# Patient Record
Sex: FEMALE
[De-identification: names, ages, dates, MRNs, and addresses within clinical notes are randomized per-mention and may not be internally consistent; named-entity substitution may affect disease eponyms.]

---

## 2017-05-12 NOTE — PDOC
MODERATE SEDATION ASSESSMENT


RISKS/ALTERNATIVES


Risks/Alternatives


Risks and alternatives of this type of sedation and procedure discussed with:


RISK/ALTERNATIVES:  Patient





H & P ON CHART


H & P


H & P on chart and reviewed for co-morbid conditions and appropriate labs.


H&P ON CHART:  Yes





PREGNANCY STATUS


PREG STATUS ASSESSED:  N/A





MEDS/ALLERGIES REVIEWED


Meds/Allergies Reviewed


Medications and Allergies including time and route of recently administered 

narcotics and sedatives.


MEDS/ALLERGIES REVIEWED:  Yes





ASA RATING


ASA RATING:  II





AIRWAY ASSESSMENT


Airway Assessment


Airway patency, oral function limitations, presence  of caps, crowns, dentures, 

partials, and ability to extend neck assessed.


AIRWAY ASSESSMENT:  Yes





MALLAMPATI SCORE


MALLAMPATI SCORE:  II





PRE-SEDATION ASSESSMENT


PRE-SEDATION ASSESSMENT:  Yes











SARAHI DOUGLAS MD May 12, 2017 11:39

## 2017-05-12 NOTE — EKG
[facility]

              [address] [place] [address]

Test Date:    2017               Test Time:    00:32:09

Pat Name:     JESSEE TOMPKINS        Department:   

Patient ID:   [id number]           Room:         256 1

Gender:       F                        Technician:   

:          1980               Requested By: ANDREI LONDONO

Order Number: [id number]            Reading MD:   Capo Davis

                                 Measurements

Intervals                              Axis          

Rate:         108                      P:            180

CA:           112                      QRS:          16

QRSD:         148                      T:            -123

QT:           376                                    

QTc:          508                                    

                           Interpretive Statements

SINUS TACHYCARDIA

LBBB

Electronically Signed On 5- 9:33:46 CDT by Capo Davis

## 2017-05-12 NOTE — CARD
--------------- APPROVED REPORT --------------





Procedure(s) performed: Left heart catheterization, selective coronary angiography and left ventricul
ography via right transradial approach



INDICATION

The indication(s) include : Unstable angina, cardiomyopathy, acute systolic heart failure.



PROCEDURE NARRATIVE

After explaining the risks, benefits and alternative options, informed consent was obtained from pamela
ent.  Patient was brought to the cardiac Cath Lab and right wrist was prepped and draped in the usual
 fashion after confirming a positive modified Geoffrey's test.  Arterial access was obtained in the [facility] radial artery and a 6 [place] sheath was inserted.  6 [place] JL 3.5 and 6 [place] JR4 catheters were 
used to perform selective angiography of the left and right coronary arteries after initial attempts 
to engage these vessels using 6 [place] Tommy catheter were unsuccessful.  6 [place] pigtail catheter 
was used to perform left ventriculography.  Patient tolerated the procedure well.  Hemostasis was ach
ieved using TR band.  There were no immediate complications.  The following findings were noted.



FINDINGS

1.  Hemodynamics: Left ventricular end-diastolic pressure of 27 mmHg.  No pullback gradient across th
e aortic valve.

2.  Left ventriculography: Severe global left ventricular systolic dysfunction with ejection fraction
 estimated at 15-20%.  No significant mitral regurgitation seen.

3.  Coronary angiography:

a.  The left main coronary artery arose from the left sinus of Valsalva, gave rise to the left anteri
or descending and left circumflex arteries and did not show any significant stenosis.

b.  The left anterior descending artery did not show any significant stenosis.

c.  The left circumflex artery did not show any significant stenosis.

d.  The right coronary artery was a large and dominant vessel arising from the right sinus of Valsalv
a that did not show any significant stenosis.



Conclusion

1.  No significant coronary artery disease

2.  Severe global left ventricular systolic dysfunction with ejection fraction estimated at 15-20%



Recommendations

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
myopathy. 

Repeat 2-D echo in 3 months to evaluate the need for AICD implantation.

## 2017-05-12 NOTE — EKG
[facility]

              [address] [place] [address]

Test Date:    2017               Test Time:    10:28:40

Pat Name:     JESSEE TOMPKINS        Department:   

Patient ID:   [id number]           Room:         256 1

Gender:       F                        Technician:   

:          1980               Requested By: EVANGELINA MCKAY

Order Number: [id number]            Reading MD:   Capo Davis

                                 Measurements

Intervals                              Axis          

Rate:         88                       P:            48

MA:           132                      QRS:          44

QRSD:         122                      T:            -50

QT:           412                                    

QTc:          502                                    

                           Interpretive Statements

SINUS RHYTHM

LBBB

Electronically Signed On 5- 9:36:33 CDT by Capo Davis

## 2017-05-12 NOTE — ACF
Admission Forms Criteria


                   OBSTETRIC AND GYNECOLOGIC DISEASE [facility]





Clinical Indications for Admission to Inpatient Care





                                                                               

       (Place 'X' for any and all applicable criteria):





Hospital admission is needed for appropriate care of the patient because of 1 

or more of the following (1)(2)(3): 





[ ]I.   Hemodynamic instability, as indicated by 1 or more of the following (1)(

2)(3)(4)(5):


        [ ]a)   Vital signs or other findings not as expected for chronic 

patient condition or baseline


        [ ]b)   Instability indicated by 1 or more of the following:


                        [ ]i)       Hypotension


                        [ ]ii)      Symptomatic tachycardia unresponsive to 

treatment (eg, analgesia, fluids, sedation as indicated)


                        [ ]iii)     Inadequate perfusion indicated by 1 or more 

of the following:


                                   [ ]A.   Lactic acidosis (greater than 2 mmol/

L)


                                   [ ]B.   New abnormal capillary refill (

greater than 3 seconds)


                                   [ ]C.   Reduced urine output


                                   [ ]D.   New altered mental status


                        [ ]iv)     Orthostatic vital sign changes unresponsive 

to treatment (eg, fluids)


                        [ ]v)      Multiple IV fluid boluses required to 

maintain adequate blood pressure or perfusion


                        [ ]vi)     IV inotropic or vasopressor medication 

required to maintain adequate blood pressure or perfusion


[ ]II.   Obstetric infection requiring hospitalization indicated by 1 or more 

of the following(13)(14):


         [ ]a)        Chorioamnionitis


         [ ]b)        Endometritis (except mild postpartum endometritis)


         [ ]c)        Pelvic abscess


         [ ]d)        Peritonitis


         [ ]e)        Septic pelvic thrombophlebitis


[ ]III.  Amniotic fluid or pulmonary embolism(4)(5)(6)


[ ]IV. Suspected peritonitis or ectopic pregnancy requiring monitoring beyond 

scope of 24 hours or observation care(7)(8)


[ ]V.  Fetal compromise requiring hospitalization indicated by ALL of the 

following(9)(10):


        [ ]a)   Fetal compromise indicated by 1 or more of the following(11):


                        [ ]i)       Abnormal fetal heart rate monitoring


                        [ ]ii)      Abnormal contraction stress test


                        [ ]iii)     Abnormal fetal biophysical profile


                        [ ]iv)     Abnormal Doppler flow in fetal vessels (ie, 

Doppler velocimetry) (12)


        [ ]b)   Persistence of fetal compromise indicators during evaluation 

and observation monitoring


[ ]VI. Ovarian hyperstimulation syndrome requiring hospitalization[A] indicated 

by ALL of the following(15):


        [ ]a)   Recent ovarian stimulation with gonadotropins, or evidence on 

ultrasound of spontaneous emergence 


                        of large number of ovarian follicles


        [ ]b)   Evidence of severe ovarian hyperstimulation syndrome indicated 

by 1 or more of the following:


                        [ ]i)      Abdominal pain unresponsive to oral therapy


                        [ ]ii)     Acute respiratory distress syndrome


                        [ ]iii)    Electrolyte imbalance ( eg, hyponatremia, 

hyperkalemia)


                        [ ]iv)    Elevated liver enzymes


                        [ ]v)     Evidence of thromboembolism


                        [ ]vi)    Hemoconcentration (hematocrit greater than 45

% (0.45))


                        [ ]vii)   Inability to maintain oral intake adequate to 

prevent hemoconcentration


                        [ ]viii)  Marked hypotension from baseline (eg, SBP 20 

mmHg below patients usual pressure)


                        [ ]ix)   Oliguria or anuria


                        [ ]x)    Ovarian torsion


                        [ ]xi)   Pleural or pericardial effusion on x-ray or 

echocardiogram


                        [ ]xii)  Rapid increase in serum creatinine to greater 

than 1.2 mg/dL (106 micromoles/L) or creatinine 


                                 clearance less than 50 mL/min/1.73m2 (0.84 mL/

sec/1.73m2)


                        [ ]xiii) Ruptured ovarian cyst with hemorrhage


                        [ ]xiv) Severe abdominal pain or peritoneal signs


                        [ ]xv)  Tense ascites that cannot be managed with 

paracentesis in outpatient setting


[ ]VII.Pelvic infection requiring hospitalization indicated by 1 or more of the 

following (16):


        [ ]a)   Outpatient treatment has failed or is not appropriate (eg, 

inpatient monitoring required)


        [ ]b)   Pelvic abscess


        [ ]c)   Surgical emergency cannot be excluded (eg, rigid abdomen)


        [ ]d)   Vomiting precluding outpatient and observation care management


VIII.   Pregnancy loss complications requiring inpatient medical treatment 

indicated by 1 or more of the following (4)(7)(9):


        [ ]a)   Fever


        [ ]b)   Peritonitis


        [ ]c)   Sepsis


        [ ]d)   Severe abdominal pain


[X]IX.  Pregnant or postpartum patient requiring monitoring for severe heart 

failure, pulmonary disease, or other 


         comorbid condition (eg, peripartum cardiomyopathy) (4)(17)


[ ]X.   Pregnant patient with  rupture of membranes requiring 

hospitalization indicated by ANY ONE of the following:


         [ ]a)        Chorioamnionitis, cloudy amniotic fluid, or other 

evidence of infection


         [ ]b)        Fetal compromise or other need for fetal monitoring (11)


         [ ]c)        Gestation longer than 23 weeks and ANY ONE of the 

following:


                       [ ]i)        Abnormal (noncephalic) presentation


                       [ ]ii)       Inadequate home environment (eg, home too 

far from hospital, unable to rapidly return to hospital)


           [ ]d)      Temperature greater than 100.4 degrees F (38 degrees C)(

oral)


           [ ]e)      Threatened  labor requiring monitoring beyond 

scope (eg, over 24 hours) of observation Care


[ ]  XI.Postpartum complications, including severe lacerations, infections, or 

retained placenta (19)


[ ]  XII.Uterine bleeding with high-risk features indicated by ANY ONE of the 

following (4):


          [ ]a)      Active major hemorrhage (eg, postpartum hemorrhage)


          [ ]b)      Coagulopathy with active bleeding


          [ ]c)      Gestational trophoblastic disease (eg, molar pregnancy) (20

)


          [ ]d)      Pregnancy (longer than 23 weeks) and ANY ONE of the 

following:


                      [ ]i)       Pain


                      [ ]ii)      Placental abruption, known or suspected


                      [ ]iii)     Placenta accrete, known or suspected(21)


                      [ ]iv)     Placenta previa, known or suspected


                      [ ]v)      Vasa previa


          [ ]e)      Severe anemia


[ ]XIII.  Obstetric or Gynecologic Disease, condition or symptom for which ANY 

ONE of the following:


          [ ]a)      Emergency and observation care have failed or are not 

considered appropriate  


                      ( Also use General Criteria: Observation Care  Criteria 

as appropriate)


          [ ]b)      Presence of  a General Admission Criteria or Pediatric 

General Admission Criteria











The original [facility] content created by [facility] has been revised. 


The portions of the content which have been revised are identified through the 

use of italic text or in bold, and [facility] 


has neither reviewed nor approved the modified material.All other unmodified 

content is copyright  [facility].





Please see references footnoted in the original [facility] edition 

2016


Admission Criteria Met?:  Yes











HEMANT COOK May 12, 2017 02:27

## 2017-05-12 NOTE — CARD
--------------- APPROVED REPORT --------------





EXAM: Two-dimensional and M-mode echocardiogram with Doppler and color Doppler.



Other Information 

Quality : Good



INDICATION

Postpardum Cardiomyopathy



2D DIMENSIONS 

RVDd2.7 (2.9-3.5cm)Left Atrium(2D)4.7 (1.6-4.0cm)

IVSd1.1 (0.7-1.1cm)Aortic Root(2D)2.5 (2.0-3.7cm)

LVDd6.1 (3.9-5.9cm)LVOT Diameter2.1 (1.8-2.4cm)

PWd1.1 (0.7-1.1cm)LVDs4.8 (2.5-4.0cm)

FS (%) 20.4 %SV75.8 ml

LVEF(%)10.0 (>50%)



Aortic Valve

AoV Peak Vipin.123.0cm/sAoV VTI18.8cm

AO Peak GR.6.1mmHgLVOT  VTI 15.96cm

AO Mean GR.4mmHgAVA   (VTI)2.90cm2



Mitral Valve

MV E Yszzfiiy708.8cm/sMV DECEL PJYW12ea

MV A Jyfdutti73.5cm/sE/A  Ratio2.0



TDI

Lateral E' P. V6.69cm/sMedial E' P. V6.24cm/s

E/Lateral E'18.4E/Medial E'19.7



Tricuspid Valve

TR P. Isdagxqu744le/sRAP VKGWYBJB6zkCk

TR Peak Gr.58ncUwNAEO53plDs



Pulmonary Vein

S1 Cfgyvcck20.2cm/sS2 Dvqiyhlu50.57cm/s

D2 Axankpbr66.6cm/s



 LEFT VENTRICLE 

The Left Ventricle is mildly dilated. There is normal left ventricular wall thickness. Left ventricle
 systolic function is severely impaired. The Ejection Fraction is 10-15%. There is severe global hypo
kinesis of the left ventricle. Tissue Doppler imaging reveals moderate left ventricular diastolic dys
function.



 RIGHT VENTRICLE 

The right ventricle is normal size. The right ventricular systolic function is normal.



 ATRIA 

The left atrium is mildly dilated. The right atrium size is normal. The interatrial septum is intact 
with no evidence for an atrial septal defect or patent foramen ovale as noted on 2-D or Doppler imagi
ng.



 AORTIC VALVE 

The aortic valve is normal in structure and function. Doppler and Color Flow revealed no significant 
aortic regurgitation. There is no significant aortic valvular stenosis.



 MITRAL VALVE 

The mitral valve is normal in structure and function. There is no evidence of mitral valve prolapse. 
There is no mitral valve stenosis. Doppler and Color-flow revealed trace mitral regurgitation.



 TRICUSPID VALVE 

The tricuspid valve is normal in structure and function. Doppler and Color Flow revealed trace tricus
pid regurgitation. The PA pressure was estimated at 30 mmHg. There is no tricuspid valve stenosis.



 PULMONIC VALVE 

The pulmonary valve is normal in structure and function. Doppler and Color Flow revealed trace pulmon
ic valvular regurgitation. There is no pulmonic valvular stenosis.



 GREAT VESSELS 

The aortic root is normal in size. The ascending aorta is normal in size. The IVC is normal in size a
nd collapses >50% with inspiration.



 PERICARDIAL EFFUSION 

There is a trace circumferential pericardial effusion.



Critical Notification

Physician Notified 

Date: 05/12/2017

Time: 09:25

Physician Name:KATELYNN Madrid

Critical Value: Yes



<Conclusion>

Left ventricle systolic function is severely impaired. The Ejection Fraction is 10-15%.

There is severe global hypokinesis of the left ventricle.

## 2017-05-12 NOTE — PDOC
Provider Note


Provider Note


[id number]





dyspnea, multi factorial, acute sys chf, mild ae of asthma





see orders











MARIO ESCOBAR MD May 12, 2017 10:39

## 2017-05-12 NOTE — HP
ADMIT DATE:  2017



CHIEF COMPLAINT:  Chest pain, shortness of breath.



HISTORY OF PRESENT ILLNESS:  The patient is a 36-year-old -American woman

who presented to the Emergency Room with significant substernal chest pain which

had been present on and off for about a week and acutely worsened the day prior

to presentation.  She actually had seen her PCP and had been given a

prescription for prednisone and Z-GRETCHEN with history of asthma, but without any

noticeable relief.  Of note, the patient delivered her fourth child, via

, 3 weeks ago.  Pregnancy had been complicated by eclampsia and she had

been started on nifedipine for blood pressure at the time.



In the Emergency Room, she was found tachycardic, hypoxic; chest x-ray revealing

massive cardiomegaly and fluffy infiltrates bilaterally.  She was therefore

tentatively diagnosed with cardiomyopathy postpartum and admitted to the [facility]

for further management and care.  She has been started on Lasix IV with good

results.  Although she had required BiPAP overnight, she is now breathing with

nasal cannula 2 liters supplemental oxygen.



PAST MEDICAL HISTORY:  Pregnancy as above.  She never had hypertension or

cardiac issues prior.



FAMILY HISTORY:  Positive for heart disease with mother having CABG at age 50.



SOCIAL HISTORY:  Lives with her 4 children, age 3 months to 18 years and fiance.

 She works as a .  No toxic habits, never smoked.



ALLERGIES:  No known drug allergies.



HOME MEDICATIONS:  Nifedipine.



REVIEW OF SYSTEMS:  The patient relates that breathing is currently much

improved.  She is very fatigued, does not get much rest overnight.  Denies any

ongoing chest pain, nausea, vomiting or other symptoms in rest of organ system

review.



PHYSICAL EXAMINATION:

VITAL SIGNS:  From today show a blood pressure of 131/88, heart rate of 99,

respiratory rate at 18.  She is afebrile.

GENERAL:  This is a massively obese 36-year-old -American woman, awake,

alert, in no acute distress.

HEENT:  Shows no scleral icterus.

NECK:  Supple.

LUNGS:  Clear to auscultation bilaterally.

HEART:  Tachycardic.

ABDOMEN:  Massively obese.

EXTREMITIES:  Show no edema.

SKIN:  Warm, soft and dry without any rash.



LABORATORY DATA:  CBC from today shows a WBC of 14.5, hemoglobin 11, platelets

of 223, MCV at 80.  BUN and creatinine are 14 and 1.1.  Sodium 141, potassium

3.3.  Of note, at admission, BUN and creatinine 14 and 1 and normal

electrolytes.  Initial troponin was negative.  LFTs within normal, slight

increase in second troponin to 0.056.  ProBNP 2542.  LFTs obtained show

cholesterol at 257, triglycerides 183 with LDL cholesterol at 160.



ASSESSMENT AND PLAN:  The patient is a 36-year-old -American woman with

postpartum cardiomyopathy.  Echo actually was done this morning and ejection

fraction appears to be about 10%.  Considering, she is actually astonishingly

mild in her symptoms.  Continue IV Lasix for diuresis for the time being.



She actually already underwent a cardiac catheterization, this shows an EF of

15-20% without any significant coronary artery disease.  There is severe global

left ventricular systolic dysfunction.



Recommendations for optimization of medical therapy for her nonischemic

cardiomyopathy.  She will be followed on an outpatient basis as well with a

repeat echo in anticipated 3 months.



For hypercholesterolemia, she will be started on atorvastatin.  Prophylaxis will

be achieved with Lovenox.

 



______________________________

BHASKAR CONTEH MD



DR:  FRIEDA/nts  JOB#:  [id number] / [id number]

DD:  2017 13:24  DT:  2017 18:11

MYRNA

## 2017-05-12 NOTE — PHYS DOC
Past Medical History


Past Medical History:  Asthma, Hypertension


Past Surgical History:  , Other


Additional Past Surgical Histo:  HERNIA REPAIR


Alcohol Use:  None


Drug Use:  None





Adult General


Chief Complaint


Chief Complaint:  CHEST PAIN





HPI


HPI


36-year-old female who states she's having significant chest pain that is 

substernal and has been present for the last week and has acutely worsened 

throughout the last day. Patient's been seen by her primary doctor and started 

on a course of prednisone and Z-Srinivas without any relief. Patient does have 

history of asthma. She also states she had a complication with her recent 

pregnancy and was diagnosed with preeclampsia and had her pregnancy 3 weeks 

early in early April. She is currently on nifedipine for blood pressure 

control. Pt had no history of HTN she states prior to her last pregnancy. Pt 

denies any significant smoking history and drug use.





Review of Systems


Review of Systems





Constitutional: Denies fever or chills []


Eyes: Denies change in visual acuity, redness, or eye pain []


HENT: Denies nasal congestion or sore throat []


Respiratory: Has cough, has shortness of breath []


Cardiovascular: No additional information not addressed in HPI []


GI: Denies abdominal pain, nausea, vomiting, bloody stools or diarrhea []


: Denies dysuria or hematuria []


Musculoskeletal: Denies back pain or joint pain []


Integument: Denies rash or skin lesions []


Neurologic: Denies headache, focal weakness or sensory changes []


Endocrine: Denies polyuria or polydipsia []





Current Medications


Current Medications





Current Medications








 Medications


  (Trade)  Dose


 Ordered  Sig/Skye  Start Time


 Stop Time Status Last Admin


Dose Admin


 


 Albuterol/


 Ipratropium


  (Duoneb)  3 ml  1X  ONCE  17 02:00


 17 02:01 DC 17 01:56


3 ML


 


 Fentanyl Citrate


  (Fentanyl 2ml


 Vial)  50 mcg  1X  ONCE  17 02:00


 17 02:01 DC 17 02:17


50 MCG


 


 Info


  (Do NOT chart on


 this entry -- for


 MONITORING)  1 each  PRN DAILY  PRN  17 01:30


 17 01:29   


 


 


 Iohexol


  (Omnipaque 300


 Mg/ml)  75 ml  1X  ONCE  17 02:00


 17 02:01 DC  


 











Allergies


Allergies





Allergies








Coded Allergies Type Severity Reaction Last Updated Verified


 


  No Known Drug Allergies    17 No











Physical Exam


Physical Exam





Constitutional: Well developed, well nourished, moderate distress []


HENT: Normocephalic, atraumatic, bilateral external ears normal, oropharynx 

moist, no oral exudates, nose normal. []


Eyes: PERRLA, EOMI, conjunctiva normal, no discharge. [] 


Neck: Normal range of motion, no tenderness, supple, no stridor. [] 


Cardiovascular:Heart rate tachycardic with regular rhythm, no murmur []


Lungs & Thorax:  Diminished bilaterally, tachypneic, moderate respiratory 

distress. []


Abdomen: Bowel sounds normal, soft, no tenderness, no masses, no pulsatile 

masses. [] 


Skin: Warm, dry, no erythema, no rash. [] 


Back: No tenderness, no CVA tenderness. [] 


Extremities: No tenderness, no cyanosis, no clubbing, ROM intact, no edema. [] 


Neurologic: Alert and oriented X 3, normal motor function, normal sensory 

function, no focal deficits noted. []


Psychologic: Affect normal, judgement normal, mood normal. []





Current Patient Data


Vital Signs





 Vital Signs








  Date Time  Temp Pulse Resp B/P (MAP) Pulse Ox O2 Delivery O2 Flow Rate FiO2


 


17 01:58     93 Nasal Cannula 4.0 


 


17 00:29 99.0 108 24 161/94 (116)    





 99.0       








Lab Values





 Laboratory Tests








Test


  17


00:40 17


01:16


 


White Blood Count


  14.5 x10^3/uL


(4.0-11.0)  H 


 


 


Red Blood Count


  4.35 x10^6/uL


(3.50-5.40) 


 


 


Hemoglobin


  11.0 g/dL


(12.0-15.5)  L 


 


 


Hematocrit


  34.6 %


(36.0-47.0)  L 


 


 


Mean Corpuscular Volume


  80 fL ()


  


 


 


Mean Corpuscular Hemoglobin 25 pg (25-35)   


 


Mean Corpuscular Hemoglobin


Concent 32 g/dL


(31-37) 


 


 


Red Cell Distribution Width


  18.5 %


(11.5-14.5)  H 


 


 


Platelet Count


  223 x10^3/uL


(140-400) 


 


 


Neutrophils (%) (Auto) 75 % (31-73)  H 


 


Lymphocytes (%) (Auto) 20 % (24-48)  L 


 


Monocytes (%) (Auto) 4 % (0-9)   


 


Eosinophils (%) (Auto) 0 % (0-3)   


 


Basophils (%) (Auto) 1 % (0-3)   


 


Neutrophils # (Auto)


  10.9 x10^3uL


(1.8-7.7)  H 


 


 


Lymphocytes # (Auto)


  2.9 x10^3/uL


(1.0-4.8) 


 


 


Monocytes # (Auto)


  0.6 x10^3/uL


(0.0-1.1) 


 


 


Eosinophils # (Auto)


  0.0 x10^3/uL


(0.0-0.7) 


 


 


Basophils # (Auto)


  0.1 x10^3/uL


(0.0-0.2) 


 


 


Sodium Level


  142 mmol/L


(136-145) 


 


 


Potassium Level


  3.7 mmol/L


(3.5-5.1) 


 


 


Chloride Level


  107 mmol/L


() 


 


 


Carbon Dioxide Level


  26 mmol/L


(21-32) 


 


 


Anion Gap 9 (6-14)   


 


Blood Urea Nitrogen


  14 mg/dL


(7-20) 


 


 


Creatinine


  1.0 mg/dL


(0.6-1.0) 


 


 


Estimated GFR


(Cockcroft-Gault) 62.4  


  


 


 


Glucose Level


  117 mg/dL


(70-99)  H 


 


 


Calcium Level


  8.9 mg/dL


(8.5-10.1) 


 


 


Total Bilirubin


  0.3 mg/dL


(0.2-1.0) 


 


 


Direct Bilirubin


  0.1 mg/dL


(0.0-0.2) 


 


 


Aspartate Amino Transferase


(AST) 19 U/L (15-37)


  


 


 


Alanine Aminotransferase (ALT)


  29 U/L (14-59)


  


 


 


Alkaline Phosphatase


  78 U/L


() 


 


 


Troponin I Quantitative


  < 0.017 ng/mL


(0.000-0.055) 


 


 


NT-Pro-B-Type Natriuretic


Peptide 2542 pg/mL


(0-124)  H 


 


 


Total Protein


  7.4 g/dL


(6.4-8.2) 


 


 


Albumin


  3.0 g/dL


(3.4-5.0)  L 


 


 


POC Urine HCG, Qualitative


  


  Hcg negative


(Negative)





 Laboratory Tests


17 00:40








 Laboratory Tests


17 00:40














EKG


EKG


EKG as interpreted by me reveals a sinus tachycardia with a rate of 108 bpm.  

There is a non-specific intraventricular block. There are some noted t-wave 

inversions to lead 2, lead 3, AVF, V5, V6. This EKG does not meet STEMI 

criteria.





Radiology/Procedures


Radiology/Procedures


Portable one view of the chest as interpreted by me shows a cardiomegaly with 

no other acute abnormality seen.





Course & Med Decision Making


Course & Med Decision Making


Pertinent Labs and Imaging studies reviewed. (See chart for details)





This 36-year-old female is having similar chest pain or shortness of breath. 

She is mildly hypertensive upon arrival and in mild distress. Her chest film 

shows significant cardiomegaly. Her EKG shows a sinus tachycardia with some 

noted T-wave inversions throughout. Her recent pregnancy puts her at moderate 

risk for pulmonary embolus and she'll receive a CT of her chest to rule this 

out.





Her laboratory workup indicates a slightly elevated white count. Her troponin 

is negative. Her BNP is elevated at 2459. Patient was taken to get a CT of her 

chest but when attempting to lie flat she became acutely anxious and unable to 

undergo this study. She was returned to the room and will have a breathing 

treatment administered. A dose of Ativan will be given and another attempt to 

obtain a CT scan will be made.





After several DuoNeb treatments and a dose of Ativan, the patient is still 

significantly tachycardic and orthopneic and unwilling to lie flat for CT. A 

prophylactic dose of Lovenox will be given. Lasix will be given as well. At 

this time, it appears that the patient is having an advanced postpartum 

cardiomyopathy. Patient will be placed on BiPAP and a blood gas will be 

obtained after 30 minutes of treatment. Patient will be admitted on BiPAP 

therapy in an attempt to obtain a CT of her chest will be made later today. I 

will communicate this with the hospitalist in the morning. Consults to 

cardiology and OB will be made. She was admitted to the [facility] without incident. 

Her blood gas upon admission showed a pH of 7.35 with a pCO2 of 38.5 and a p02 

of 56.3 with a bicarb of 21.





Dragon Disclaimer


Dragon Disclaimer


This electronic medical record was generated, in whole or in part, using a 

voice recognition dictation system.





Departure


Departure


Impression:  


 Primary Impression:  


 Postpartum cardiomyopathy


 Additional Impressions:  


 CHF exacerbation


 Dyspnea


Disposition:   ADMITTED AS INPATIENT


Admitting Physician:  Paz, Batsheva


Condition:  STABLE


Referrals:  


UNKNOWN PCP NAME (PCP)





Problem Qualifiers











ANDREI LONDONO DO May 12, 2017 01:34

## 2017-05-12 NOTE — CONS
DATE OF CONSULTATION:  05/12/2017



I was asked to see this 36-year-old lady for shortness of breath, asthma.



HISTORY OF PRESENT ILLNESS:  The patient has minimal remote history of smoking. 

She had her baby on 04/03/2017 and since then, she has had significant shortness

of breath.  She has had occasional cough and wheezing.  She has nasal

congestion.  She has had occasional chest pain.  She had an echocardiogram done

during this hospitalization, which did show ejection fraction of 10-15%;

Cardiology has admitted.  She was given Lasix with improvement of her shortness

of breath.  She was on BiPAP overnight.  She does have snoring and excessive

daytime sleepiness, has not had any sleep study.



PAST MEDICAL HISTORY:  Asthma, preeclampsia during pregnancy.



ALLERGIES:  No known drug allergies.



MEDICATIONS:  She is on Lovenox 120 mg b.i.d., DuoNeb, Lasix was given.



SOCIAL HISTORY:  Minimal remote history of smoking.



FAMILY HISTORY:  There is no history of lung disease.



REVIEW OF SYSTEMS:  As mentioned as above, other systems are otherwise negative.



PHYSICAL EXAMINATION:

GENERAL:  This is an obese lady.

VITAL SIGNS:  Her O2 saturation is 94%, respiratory rate 20, heart rate 98,

blood pressure 121/76, temperature 98.1.

HEENT:  Normocephalic, atraumatic.  Pupils equal, round, reactive to light. 

Throat is clear.  There is shallow oropharynx.  Nose is clear.

NECK:  Positive JVD.  No lymphadenopathy.

CARDIOVASCULAR:  Regular rate and rhythm.  PMI is nondisplaced.

CHEST:  Inspection is normal.

LUNGS:  There are bibasilar crackles, a few end expiratory wheezing.

ABDOMEN:  Soft and obese, bowel sounds are good.  There is no mass.

EXTREMITIES:  Trace edema.

LYMPHATICS:  There is no lymphadenopathy.

SKIN:  Warm.

NEUROLOGIC:  Alert and oriented x 3.



LABORATORY DATA:  I reviewed the following lab data:  Chest x-ray shows

cardiomegaly, increased vascular marking.  WBC 14.5, hemoglobin 11, platelet

223.  Sodium 141, potassium 3.3, chloride 106, CO2 of 27, glucose 93, BUN 14,

creatinine 1.1.  BNP 2542.  Troponin 0.05.  Triglyceride 183, cholesterol 257. 

Total bilirubin 0.3, AST 19, ALT 29, alkaline phosphatase 78.



IMPRESSION:

1.  Dyspnea, multifactorial in etiology including acute systolic congestive

heart failure, asthma with mild acute exacerbation versus others.

2.  Abnormal chest x-ray.

3.  Acute systolic congestive heart failure.

4.  Postpartum cardiomyopathy.

5.  Obesity; snoring and excessive daytime sleepiness, probable obstructive

sleep apnea-hypopnea syndrome.

6.  Preeclampsia in recent pregnancy.



PLAN AND RECOMMENDATIONS:

1.  Titrate FiO2 to keep O2 saturation 92%.

2.  Bronchodilator.

3.  Add inhaled corticosteroid.

4.  Keep intake less than output.  I do recommend Lasix.  Monitor potassium and

creatinine.

5.  I agree with CT angiogram.

6.  Continue Lovenox until a CT angiogram is done.  If it is negative for

pulmonary embolism, I do recommend to change Lovenox to 40 mg subcutaneous

daily.

7.  I have discussed obstructive sleep apnea-hypopnea syndrome.  The importance

of diagnosis and treatment, if untreated, increased cardiovascular and CNS

morbidity and mortality.  I do recommend a sleep study as an outpatient.

8.  pfts as an outpatient.

9.  Cardiology is consulted.

10.  The findings and recommendations were discussed with the patient and RN.  I

have answered all of the patient's questions.



Thank you very much for allowing me to participate in care of this very nice

lady.

 



______________________________

MARIO ESCOBAR M.D.



DR:  Bonny  JOB#:  [id number] / [id number]

DD:  05/12/2017 10:38  DT:  05/12/2017 12:00

MYRNA

## 2017-05-12 NOTE — RAD
Exam performed: One view chest. 



Indication: chest pain tonight



Date of Service: 5/12/2017 2:26 AM  Comparison: 09/20/11.



Single AP upright portable view chest findings:



Moderate cardiomegaly. Pulmonary vascularity is unremarkable. No acute

infiltrates, effusion or pneumothorax is detected.  The bony structures are

normal. 



Impression:



No acute cardiopulmonary process is detected.

## 2017-05-12 NOTE — PDOC2
EVANGELINA MCKAY APRN 17 0920:


CARDIAC CONSULT


DATE OF CONSULT


Date of Consult


DATE: 17 


TIME: 09:02





REASON FOR CONSULT


Reason for Consult:


CHF exacerbation, post partum cardiomyopathy





REFERRING PHYSICIAN


Referring Physician:


Ree





SOURCE


Source:  Chart review, Patient





HISTORY OF PRESENT ILLNESS


HISTORY OF PRESENT ILLNESS


This is a pleasant 37 yo female admitted for complains of chest pain and SOA.  

Reports that she had preeclampsia with her BP running mainly in the 160/90s and 

her baby had to be delivered 3 weeks early via C section on 4/3/2017.  Her baby 

is 5 lbs.  She only gain about 15 lbs throughout her pregnancy.  She was taking 

procardia during her pregnancy.  Reports that 2 weeks ago she started having 

mid chest tightness as well as SOA. Notable heaviness to right arm.  It started 

of with exertion then eventually a week ago progressed at rest.  She has been 

having intermittent chest pain and last night she had diaphoresis and nausea. 2 

days ago she was though to have asthma exacerbation in which she was given by 

her PCP, Z pack and steroids but these did not help even with the use of her 

albuterol.  Notable for orthopnea and PND. Denies any hx of any recent falls, 

injury, no VTE. Denies any symptoms of HELLP syndrome during her pregnancy.  No 

prior CAD or diagnosed LAINA





PAST MEDICAL HISTORY


Cardiovascular:  HTN (preeclampsia)


Pulmonary:  Asthma


CENTRAL NERVOUS SYSTEM:  Other (No pertinent history)


GI:  No pertinent hx


Heme/Onc:  No pertinent hx


Hepatobiliary:  No pertinent hx


Psych:  No pertinent hx


Musculoskeletal:  Other (None)


Rheumatologic:  No pertinent hx


Infectious disease:  No pertinent hx


ENT:  Allergic Rhinitis


Renal/:  No pertinent hx


Endocrine:  No pertinent hx


Dermatology:  No pertinent hx


Grav:  5


Para:  4





PAST SURGICAL HISTORY


Past Surgical History:  , Hernia Repair, Tubal Ligation, Other (1 

miscarriage)





FAMILY HISTORY


Family History:  Coronary Artery Disease (Mother 50s)





SOCIAL HISTORY


Smoke:  No


ALCOHOL:  occassional


Drugs:  None


Lives:  with Family





CURRENT MEDICATIONS


CURRENT MEDICATIONS





Current Medications








 Medications


  (Trade)  Dose


 Ordered  Sig/Skye


 Route


 PRN Reason  Start Time


 Stop Time Status Last Admin


Dose Admin


 


 Albuterol/


 Ipratropium


  (Duoneb)  3 ml  1X  ONCE


 NEB


   17 02:00


 17 02:01 DC 17 01:56


 


 


 Fentanyl Citrate


  (Fentanyl 2ml


 Vial)  50 mcg  1X  ONCE


 IV


   17 02:00


 17 02:01 DC 17 02:17


 


 


 Lorazepam


  (Ativan)  1 mg  1X  ONCE


 IV


   17 02:15


 17 02:16 DC 17 02:13


 


 


 Ondansetron HCl


  (Zofran)  4 mg  PRN Q8HRS  PRN


 IV


 NAUSEA/VOMITING  17 02:15


 17 02:14  17 04:07


 


 


 Albuterol/


 Ipratropium


  (Duoneb)  3 ml  RTQID


 NEB


   17 08:00


 17 07:59  17 06:48


 


 


 Furosemide


  (Lasix)  40 mg  1X  ONCE


 IVP


   17 02:30


 17 02:31 DC 17 02:51


 


 


 Albuterol/


 Ipratropium


  (Duoneb)  3 ml  1X  ONCE


 NEB


   17 03:00


 17 03:01 DC 17 02:28


 


 


 Enoxaparin Sodium


  (Lovenox 120mg


 Syringe)  120 mg  Q12HR


 SQ


   17 03:30


    17 04:07


 


 


 Info


  (Anti-Coagulation


 Monitoring By


 Pharmacy)  1 each  PRN DAILY  PRN


 [facility]


 SEE COMMENTS  17 03:00


    17 03:39


 


 


 Benzonatate


  (Tessalon Perle)  100 mg  1X  ONCE


 PO


   17 03:30


 17 03:31 DC 17 03:30


 


 


 Benzonatate


  (Tessalon Perle)  100 mg  1X  ONCE


 PO


   17 04:00


 17 04:01 DC 17 04:07


 











ALLERGIES


ALLERGIES:  


Coded Allergies:  


     No Known Drug Allergies (Unverified , 17)





ROS


Review of System


14 point ROS evaluated with pertinent positives noted per HPI





PHYSICAL EXAM


General:  Alert, Oriented X3, Cooperative, No acute distress


HEENT:  Atraumatic, Mucous membr. moist/pink


Lungs:  Clear to auscultation, Normal air movement


Heart:  Regular rate (SR LBBB), Normal S1, Normal S2, Other (S3)


Abdomen:  Soft, No tenderness


Extremities:  No cyanosis, Other (1+ pitting edema)


Skin:  No breakdown, No significant lesion


Neuro:  Normal speech, Sensation intact


Psych/Mental Status:  Mental status NL, Mood NL


MUSCULOSKELETAL:  Full range of motion without pain





VITALS


VITALS





Vital Signs








  Date Time  Temp Pulse Resp B/P (MAP) Pulse Ox O2 Delivery O2 Flow Rate FiO2


 


17 08:25      BiPAP/CPAP  


 


17 07:00 98.1 98 20 121/76 (91) 96   





 98.1       


 


17 02:44        











LABS


Lab:





Laboratory Tests








Test


  17


00:40 17


01:16 17


02:36 17


06:41


 


White Blood Count


  14.5 x10^3/uL


(4.0-11.0) 


  


  


 


 


Red Blood Count


  4.35 x10^6/uL


(3.50-5.40) 


  


  


 


 


Hemoglobin


  11.0 g/dL


(12.0-15.5) 


  


  


 


 


Hematocrit


  34.6 %


(36.0-47.0) 


  


  


 


 


Mean Corpuscular Volume 80 fL ()    


 


Mean Corpuscular Hemoglobin 25 pg (25-35)    


 


Mean Corpuscular Hemoglobin


Concent 32 g/dL


(31-37) 


  


  


 


 


Red Cell Distribution Width


  18.5 %


(11.5-14.5) 


  


  


 


 


Platelet Count


  223 x10^3/uL


(140-400) 


  


  


 


 


Neutrophils (%) (Auto) 75 % (31-73)    


 


Lymphocytes (%) (Auto) 20 % (24-48)    


 


Monocytes (%) (Auto) 4 % (0-9)    


 


Eosinophils (%) (Auto) 0 % (0-3)    


 


Basophils (%) (Auto) 1 % (0-3)    


 


Neutrophils # (Auto)


  10.9 x10^3uL


(1.8-7.7) 


  


  


 


 


Lymphocytes # (Auto)


  2.9 x10^3/uL


(1.0-4.8) 


  


  


 


 


Monocytes # (Auto)


  0.6 x10^3/uL


(0.0-1.1) 


  


  


 


 


Eosinophils # (Auto)


  0.0 x10^3/uL


(0.0-0.7) 


  


  


 


 


Basophils # (Auto)


  0.1 x10^3/uL


(0.0-0.2) 


  


  


 


 


Sodium Level


  142 mmol/L


(136-145) 


  


  


 


 


Potassium Level


  3.7 mmol/L


(3.5-5.1) 


  


  


 


 


Chloride Level


  107 mmol/L


() 


  


  


 


 


Carbon Dioxide Level


  26 mmol/L


(21-32) 


  


  


 


 


Anion Gap 9 (6-14)    


 


Blood Urea Nitrogen


  14 mg/dL


(7-20) 


  


  


 


 


Creatinine


  1.0 mg/dL


(0.6-1.0) 


  


  


 


 


Estimated GFR


(Cockcroft-Gault) 62.4 


  


  


  


 


 


Glucose Level


  117 mg/dL


(70-99) 


  


  


 


 


Calcium Level


  8.9 mg/dL


(8.5-10.1) 


  


  


 


 


Total Bilirubin


  0.3 mg/dL


(0.2-1.0) 


  


  


 


 


Direct Bilirubin


  0.1 mg/dL


(0.0-0.2) 


  


  


 


 


Aspartate Amino Transf


(AST/SGOT) 19 U/L (15-37) 


  


  


  


 


 


Alanine Aminotransferase


(ALT/SGPT) 29 U/L (14-59) 


  


  


  


 


 


Alkaline Phosphatase


  78 U/L


() 


  


  


 


 


Troponin I Quantitative


  < 0.017 ng/mL


(0.000-0.055) 


  


  0.056 ng/mL


(0.000-0.055)


 


NT-Pro-B-Type Natriuretic


Peptide 2542 pg/mL


(0-124) 


  


  


 


 


Total Protein


  7.4 g/dL


(6.4-8.2) 


  


  


 


 


Albumin


  3.0 g/dL


(3.4-5.0) 


  


  


 


 


Bedside Urine HCG, Qualitative


  


  Hcg negative


(Negative) 


  


 


 


O2 Saturation   86 % (92-99)  


 


Arterial Blood pH


  


  


  7.35


(7.35-7.45) 


 


 


Arterial Blood pCO2 at


Patient Temp 


  


  39 mmHg


(35-46) 


 


 


Arterial Blood pO2 at Patient


Temp 


  


  56 mmHg


() 


 


 


Arterial Blood HCO3


  


  


  21 mmol/L


(21-28) 


 


 


Arterial Blood Base Excess


  


  


  -4 mmol/L


(-3-3) 


 


 


FiO2   30.0  











ASSESSMENT/PLAN


ASSESSMENT/PLAN


1. Acute systolic CHF: New.  NYHA 2


2. Cardiomyopathy: preliminary EF at 15-20% with global hypokinesis. Trop at 

0.056 with EKG at SR LBBB. New. 


3. NSTEMI: intermittent CP since 2 weeks ago. Received bipap overnight.  Now 

off. Able to lay flat O2 sat 99% on RA


4. Postpartum: early delivery via C section 4/3/2017. Recent tubal ligation. 


5. Asthma exacerbation: likely induced by CHF. 


6. Hx of preeclampsia: with recent pregnancy treated with adalat


7. Morbid obesity: BMI 46


8. Family hx of CAD: Mother just had an MI in her 50s


9. Suspect metabolic syndrome: positive for acanthoses nigricans. Negative for 

gestational DM. 


10. HTN: remains uncontrolled at home


 


FYI CTA not done due to inability to lay flat last night. 





Recommendations


1. LHC today.  Risks and benefits explained and agreeable to proceed


2. Received Lasix in ED.  


3. Will continue lasix, start onSRB, BB low dose post cath.  Will DC adalat.  

Pt does not breast feed. 


4. BMP, Mg, TSH, A1C, lipid, DDIMER


5. ASA. 


6. Will likely need lifevest. Further recommendation pending cath result


Problems:  





SARAHI DOUGLAS MD 17 1138:


CARDIAC CONSULT


ALLERGIES


ALLERGIES:  


Coded Allergies:  


     No Known Drug Allergies (Unverified , 17)





ASSESSMENT/PLAN


ASSESSMENT/PLAN


Patient seen and examined. Agree with NP's assessment and plan.


Cardiomyopathy with LVEF 15-20% most probably peripartum cardiomyopathy but 

ischemic etiology needs to be ruled out due to presentation and strong family 

history.


Plan for cardiac catheterization and possible angioplasty today. Risks and 

benefits were explained.


Start beta blockers.


Thank you for your consultation.


Problems:  











EVANGELINA MCKAY May 12, 2017 09:20


SARAHI DOUGLAS MD May 12, 2017 11:38

## 2017-05-13 NOTE — CONS
DATE OF CONSULTATION:  



HISTORY OF PRESENT ILLNESS:  This patient is a 36-year-old -American

female who is  4, para 4, admitted to the hospital with a history of

having chest pain, difficulty to breathe.  She has had last delivery by 

section and also had tubal ligation about 5 weeks ago at [facility] and she is a patient of  ____ and was sent home after the delivery and

comes back with chest pain and difficulty to breathe at this time and she has no

history of any vaginal bleeding at this time and she is not breast feeding.



PHYSICAL EXAMINATION:

VITAL SIGNS:  Her vital signs have been stable and no history of any fever.

ABDOMEN:  On examination, abdomen feels soft and uterus ____, no excessive

vaginal bleeding.  The patient is quite obese and there is no history of any

toxemia or high blood pressure during pregnancy.



DIAGNOSES:  Postpartum cardiomyopathy and congestive heart failure.



RECOMMENDATION:  Would continue with the present treatment that she is getting

and she should follow with cardiologist recommendations and she was advised bed

rest at this time until further recovery and we will be glad to follow the

patient with you.



Thank you for giving me the opportunity to participate in the care and

management of this patient.

 



______________________________

МАРИНА REYES MD



DR:  JOYCE/nic  JOB#:  [id number] / [id number]

DD:  2017 08:37  DT:  2017 15:10

## 2017-05-13 NOTE — PDOC
RAFAELDARREN TRUDY APRN 5/13/17 0930:


CARDIO Progress Notes


Date and Time


Date of Service


05/13/2017


Time of Evaluation


0926





Subjective


Subjective:  No Chest Pain, No shortness of breath, No Palpitations, No 

Dizziness





Vitals


Vitals





Vital Signs








  Date Time  Temp Pulse Resp B/P (MAP) Pulse Ox O2 Delivery O2 Flow Rate FiO2


 


5/13/17 08:53  84  119/67    


 


5/13/17 08:16      Room Air  


 


5/13/17 07:43     98   


 


5/13/17 07:00 97.6  20     





 97.6       


 


5/12/17 08:00       4.0 








Weight


Weight [ ]





Input and Output


Intake and Output











Intake and Output 


 


 5/13/17





 07:00


 


Intake Total 1400 ml


 


Output Total 3000 ml


 


Balance -1600 ml


 


 


 


Intake Oral 1400 ml


 


Output Urine Total 3000 ml


 


Urine/Stool Mix 0 ml











Laboratory


Labs





Laboratory Tests








Test


  5/12/17


09:35 5/13/17


04:40


 


D-Dimer (Neelima)


  1.65 ug/mlFEU


(0.00-0.50) 


 


 


Sodium Level


  141 mmol/L


(136-145) 140 mmol/L


(136-145)


 


Potassium Level


  3.3 mmol/L


(3.5-5.1) 4.1 mmol/L


(3.5-5.1)


 


Chloride Level


  106 mmol/L


() 107 mmol/L


()


 


Carbon Dioxide Level


  27 mmol/L


(21-32) 25 mmol/L


(21-32)


 


Anion Gap 8 (6-14)  8 (6-14) 


 


Blood Urea Nitrogen


  14 mg/dL


(7-20) 15 mg/dL


(7-20)


 


Creatinine


  1.1 mg/dL


(0.6-1.0) 1.0 mg/dL


(0.6-1.0)


 


Estimated GFR


(Cockcroft-Gault) 55.9 


  62.4 


 


 


Glucose Level


  93 mg/dL


(70-99) 91 mg/dL


(70-99)


 


Hemoglobin A1c


  5.6 %


(4.8-5.6) 


 


 


Calcium Level


  8.6 mg/dL


(8.5-10.1) 8.4 mg/dL


(8.5-10.1)


 


Magnesium Level


  1.7 mg/dL


(1.8-2.4) 


 


 


Triglycerides Level


  183 mg/dL


(0-150) 


 


 


Cholesterol Level


  257 mg/dL


(0-200) 


 


 


LDL Cholesterol, Calculated


  160 mg/dL


(0-100) 


 


 


VLDL Cholesterol, Calculated


  37 mg/dL


(0-40) 


 


 


Non-HDL Cholesterol Calculated


  197 mg/dL


(0-129) 


 


 


HDL Cholesterol


  60 mg/dL


(40-60) 


 


 


Cholesterol/HDL Ratio 4.3  











Physical Exam


HEENT:  Neck Supple W Full Motion


Chest:  Symmetric


LUNGS:  Other (decreased in bases bilaterally)


Heart:  S1S2, RRR


Abdomen:  Soft N/T, Other (truncal obesity)


Extremities:  No Edema


Neurology:  alert, oriented, follow commands





Assessment


Assessment


1. Acute systolic CHF


   NYHA class 2


   continue medical management


   


2. Cardiomyopathy, non-ischemic


   LVEF depressed ~ 10 -15%


   cardiac cath with significant disease; 5/12/2017


   discussed Life[employer]est with patient and given information - she will consider - 

concerned about ability to work


3.  HTN


   pre-eclamptic with recent pregnancy


   controlled with meds


   


 4. Asthma exacerbation


   per pulm





5. Morbid obesity: BMI 46





SHERLEY ADAMS MD 5/13/17 1019:


CARDIO Progress Notes


Plan


Plan


Pt. seen and examined. AGree with above NP Note. 


No acute events. 


Cardiac cath is angiographically NORMAL. No significant disease identified. EF 

is severely diminished. 


Elevated ddimer being evaluated by pulmonary


No chest pain


Will consider lifevest and repeat outpt eval to determine if she would qualify 

for ICD


Supportive care


Ok to dc later today.











DARREN HALL May 13, 2017 09:30


SHERLEY ADAMS MD May 13, 2017 10:19

## 2017-05-13 NOTE — PDOC
PULMONARY PROGRESS NOTES


Subjective


sob, is better. has some pleuritic cp, occ cough.


Vitals





Vital Signs








  Date Time  Temp Pulse Resp B/P (MAP) Pulse Ox O2 Delivery O2 Flow Rate FiO2


 


5/13/17 08:53  84  119/67    


 


5/13/17 08:16      Room Air  


 


5/13/17 07:43     98   


 


5/13/17 07:00 97.6  20     





 97.6       


 


5/12/17 08:00       4.0 








ROS:  No Nausea, No Abdominal Pain


HEENT:  Other (nc at perrl. shallow oropharynx nose clear)


Lungs:  Clear


Cardiovascular:  S1, S2


Abdomen:  Soft, Non-tender


Neuro Exam:  Alert


Extremities:  Other (edema)


Skin:  Warm


Labs





Laboratory Tests








Test


  5/12/17


00:40 5/12/17


01:16 5/12/17


02:36 5/12/17


06:41


 


White Blood Count


  14.5 x10^3/uL


(4.0-11.0) 


  


  


 


 


Red Blood Count


  4.35 x10^6/uL


(3.50-5.40) 


  


  


 


 


Hemoglobin


  11.0 g/dL


(12.0-15.5) 


  


  


 


 


Hematocrit


  34.6 %


(36.0-47.0) 


  


  


 


 


Mean Corpuscular Volume 80 fL ()    


 


Mean Corpuscular Hemoglobin 25 pg (25-35)    


 


Mean Corpuscular Hemoglobin


Concent 32 g/dL


(31-37) 


  


  


 


 


Red Cell Distribution Width


  18.5 %


(11.5-14.5) 


  


  


 


 


Platelet Count


  223 x10^3/uL


(140-400) 


  


  


 


 


Neutrophils (%) (Auto) 75 % (31-73)    


 


Lymphocytes (%) (Auto) 20 % (24-48)    


 


Monocytes (%) (Auto) 4 % (0-9)    


 


Eosinophils (%) (Auto) 0 % (0-3)    


 


Basophils (%) (Auto) 1 % (0-3)    


 


Neutrophils # (Auto)


  10.9 x10^3uL


(1.8-7.7) 


  


  


 


 


Lymphocytes # (Auto)


  2.9 x10^3/uL


(1.0-4.8) 


  


  


 


 


Monocytes # (Auto)


  0.6 x10^3/uL


(0.0-1.1) 


  


  


 


 


Eosinophils # (Auto)


  0.0 x10^3/uL


(0.0-0.7) 


  


  


 


 


Basophils # (Auto)


  0.1 x10^3/uL


(0.0-0.2) 


  


  


 


 


Sodium Level


  142 mmol/L


(136-145) 


  


  


 


 


Potassium Level


  3.7 mmol/L


(3.5-5.1) 


  


  


 


 


Chloride Level


  107 mmol/L


() 


  


  


 


 


Carbon Dioxide Level


  26 mmol/L


(21-32) 


  


  


 


 


Anion Gap 9 (6-14)    


 


Blood Urea Nitrogen


  14 mg/dL


(7-20) 


  


  


 


 


Creatinine


  1.0 mg/dL


(0.6-1.0) 


  


  


 


 


Estimated GFR


(Cockcroft-Gault) 62.4 


  


  


  


 


 


Glucose Level


  117 mg/dL


(70-99) 


  


  


 


 


Calcium Level


  8.9 mg/dL


(8.5-10.1) 


  


  


 


 


Total Bilirubin


  0.3 mg/dL


(0.2-1.0) 


  


  


 


 


Direct Bilirubin


  0.1 mg/dL


(0.0-0.2) 


  


  


 


 


Aspartate Amino Transf


(AST/SGOT) 19 U/L (15-37) 


  


  


  


 


 


Alanine Aminotransferase


(ALT/SGPT) 29 U/L (14-59) 


  


  


  


 


 


Alkaline Phosphatase


  78 U/L


() 


  


  


 


 


Troponin I Quantitative


  < 0.017 ng/mL


(0.000-0.055) 


  


  0.056 ng/mL


(0.000-0.055)


 


NT-Pro-B-Type Natriuretic


Peptide 2542 pg/mL


(0-124) 


  


  


 


 


Total Protein


  7.4 g/dL


(6.4-8.2) 


  


  


 


 


Albumin


  3.0 g/dL


(3.4-5.0) 


  


  


 


 


Bedside Urine HCG, Qualitative


  


  Hcg negative


(Negative) 


  


 


 


Urine Opiates Screen  Neg (NEG)   


 


Urine Methadone Screen  Neg (NEG)   


 


Urine Barbiturates  Neg (NEG)   


 


Urine Phencyclidine Screen  Neg (NEG)   


 


Urine


Amphetamine/Methamphetamine 


  Neg (NEG) 


  


  


 


 


Urine Benzodiazepines Screen  Neg (NEG)   


 


Urine Cocaine Screen  Neg (NEG)   


 


Urine Cannabinoids Screen  Neg (NEG)   


 


Urine Ethyl Alcohol  Neg (NEG)   


 


O2 Saturation   86 % (92-99)  


 


Arterial Blood pH


  


  


  7.35


(7.35-7.45) 


 


 


Arterial Blood pCO2 at


Patient Temp 


  


  39 mmHg


(35-46) 


 


 


Arterial Blood pO2 at Patient


Temp 


  


  56 mmHg


() 


 


 


Arterial Blood HCO3


  


  


  21 mmol/L


(21-28) 


 


 


Arterial Blood Base Excess


  


  


  -4 mmol/L


(-3-3) 


 


 


FiO2   30.0  


 


Thyroid Stimulating Hormone


(TSH) 


  


  


  0.923 uIU/mL


(0.358-3.74)


 


Test


  5/12/17


09:35 5/13/17


04:40 


  


 


 


D-Dimer (Neelima)


  1.65 ug/mlFEU


(0.00-0.50) 


  


  


 


 


Sodium Level


  141 mmol/L


(136-145) 140 mmol/L


(136-145) 


  


 


 


Potassium Level


  3.3 mmol/L


(3.5-5.1) 4.1 mmol/L


(3.5-5.1) 


  


 


 


Chloride Level


  106 mmol/L


() 107 mmol/L


() 


  


 


 


Carbon Dioxide Level


  27 mmol/L


(21-32) 25 mmol/L


(21-32) 


  


 


 


Anion Gap 8 (6-14)  8 (6-14)   


 


Blood Urea Nitrogen


  14 mg/dL


(7-20) 15 mg/dL


(7-20) 


  


 


 


Creatinine


  1.1 mg/dL


(0.6-1.0) 1.0 mg/dL


(0.6-1.0) 


  


 


 


Estimated GFR


(Cockcroft-Gault) 55.9 


  62.4 


  


  


 


 


Glucose Level


  93 mg/dL


(70-99) 91 mg/dL


(70-99) 


  


 


 


Hemoglobin A1c


  5.6 %


(4.8-5.6) 


  


  


 


 


Calcium Level


  8.6 mg/dL


(8.5-10.1) 8.4 mg/dL


(8.5-10.1) 


  


 


 


Magnesium Level


  1.7 mg/dL


(1.8-2.4) 


  


  


 


 


Triglycerides Level


  183 mg/dL


(0-150) 


  


  


 


 


Cholesterol Level


  257 mg/dL


(0-200) 


  


  


 


 


LDL Cholesterol, Calculated


  160 mg/dL


(0-100) 


  


  


 


 


VLDL Cholesterol, Calculated


  37 mg/dL


(0-40) 


  


  


 


 


Non-HDL Cholesterol Calculated


  197 mg/dL


(0-129) 


  


  


 


 


HDL Cholesterol


  60 mg/dL


(40-60) 


  


  


 


 


Cholesterol/HDL Ratio 4.3    








Laboratory Tests








Test


  5/12/17


09:35 5/13/17


04:40


 


D-Dimer (Neelima)


  1.65 ug/mlFEU


(0.00-0.50) 


 


 


Sodium Level


  141 mmol/L


(136-145) 140 mmol/L


(136-145)


 


Potassium Level


  3.3 mmol/L


(3.5-5.1) 4.1 mmol/L


(3.5-5.1)


 


Chloride Level


  106 mmol/L


() 107 mmol/L


()


 


Carbon Dioxide Level


  27 mmol/L


(21-32) 25 mmol/L


(21-32)


 


Anion Gap 8 (6-14)  8 (6-14) 


 


Blood Urea Nitrogen


  14 mg/dL


(7-20) 15 mg/dL


(7-20)


 


Creatinine


  1.1 mg/dL


(0.6-1.0) 1.0 mg/dL


(0.6-1.0)


 


Estimated GFR


(Cockcroft-Gault) 55.9 


  62.4 


 


 


Glucose Level


  93 mg/dL


(70-99) 91 mg/dL


(70-99)


 


Hemoglobin A1c


  5.6 %


(4.8-5.6) 


 


 


Calcium Level


  8.6 mg/dL


(8.5-10.1) 8.4 mg/dL


(8.5-10.1)


 


Magnesium Level


  1.7 mg/dL


(1.8-2.4) 


 


 


Triglycerides Level


  183 mg/dL


(0-150) 


 


 


Cholesterol Level


  257 mg/dL


(0-200) 


 


 


LDL Cholesterol, Calculated


  160 mg/dL


(0-100) 


 


 


VLDL Cholesterol, Calculated


  37 mg/dL


(0-40) 


 


 


Non-HDL Cholesterol Calculated


  197 mg/dL


(0-129) 


 


 


HDL Cholesterol


  60 mg/dL


(40-60) 


 


 


Cholesterol/HDL Ratio 4.3  








Medications





Active Scripts








 Medications  Dose


 Route/Sig


 Max Daily Dose Days Date Category


 


 Proair Hfa


 Inhaler


  (Albuterol


 Sulfate) 8.5 Gm


 Hfa.aer.ad  1 Puff


 INH PRN Q6HRS PRN


    5/12/17 Reported


 


 Alavert


  (Loratadine) 10


 Mg Tab.rapdis  10 Mg


 PO


    5/12/17 Reported


 


 Azithromycin


 Tablet


  (Azithromycin)


 250 Mg Tablet  250 Mg


 PO DAILY


    5/12/17 Reported


 


 Prednisone 50 Mg


 Tablet  1 Tab


 PO DAILY


    5/12/17 Reported


 


 Nifedipine Er


  (Nifedipine) 30


 Mg Tab.er.24  1 Tab


 PO DAILY


    5/12/17 Reported








Comments


Left ventricle systolic function is severely impaired. The Ejection Fraction is 

10-15%.


There is severe global hypokinesis of the left ventricle.





Impression


.


IMPRESSION:


1.  Dyspnea, multifactorial in etiology including acute systolic congestive


heart failure, asthma with mild acute exacerbation versus others.


2.  Abnormal chest x-ray.


3.  Acute systolic congestive heart failure.


4.  Postpartum cardiomyopathy.


5.  Obesity; snoring and excessive daytime sleepiness, probable obstructive


sleep apnea-hypopnea syndrome.


6.  Preeclampsia in recent pregnancy.





Plan


.


PLAN AND RECOMMENDATIONS:


1.  Titrate FiO2 to keep O2 saturation 92%.


2.  Bronchodilator.


3.   inhaled corticosteroid.


4.  Keep intake less than output.  I do recommend Lasix.  Monitor potassium and


creatinine.


5.  I agree with CT angiogram.


6.  Continue Lovenox until a CT angiogram is done.  If it is negative for


pulmonary embolism, I do recommend to change Lovenox to 40 mg subcutaneous


daily.


7.  I have discussed obstructive sleep apnea-hypopnea syndrome.  The importance


of diagnosis and treatment, if untreated, increased cardiovascular and CNS


morbidity and mortality.  I do recommend a sleep study as an outpatient.


8.  pfts as an outpatient.


9.  Cardiology is consulted, cardiac cath nl. 


10.  The findings and recommendations were discussed with the patient and RN.  I


have answered all of the patient's questions.











MARIO ESCOBAR MD May 13, 2017 09:10

## 2017-05-14 NOTE — RAD
Examination: CT angiogram chest



History: History of shortness of breath



Comparison: None available



Technique: Axial CT angiographic images of chest were performed with IV

contrast. Coronal and sagittal 3-D MIP reformats are performed



Please note that the images were submitted for interpretation on 5/14/2017.

The exam was performed on 5/12/2017.





RS Compliance Statement:



One or more of the following individualized dose reduction techniques were

utilized for this examination:

1. Automated exposure control

2. Adjustment of the mA and/or kV according to patient size

3. Use of iterative reconstruction technique



Findings:



The central airways are patent. Mild cardiomegaly is identified. There is no

evidence of filling defect identified in the main pulmonary artery trunk and

right and left main pulmonary arteries. The evaluation of the distal segmental

and subsegmental branches evaluation is limited on this examination due to

breathing motion artifact.



The caliber of the aorta grossly appears unremarkable.



Faint groundglass airspace opacities identified in the bilateral lungs

involving the upper lobes in the apical region and the right middle lobe could

be mild infiltrates or edema. No evidence of pleural effusion or pneumothorax

identified.



The visualized liver, spleen, adrenals grossly appears unremarkable. There is

reflux of contrast into the IVC and hepatic veins could be due to elevated

right heart pressures.



No evidence of lytic bony destructive lesion identified.





Impression:



1. No evidence of central pulmonary embolism. The evaluation of the distal

segmental and subsegmental branches is limited.



2. Faint patchy groundglass airspace opacities identified in the bilateral

upper lobes and in the right middle lobe of the lung likely infiltrates or

edema. Follow-up to resolution.



3. Mild cardiomegaly. There is reflux of contrast identified into the IVC and

hepatic veins. Correlate for elevated right heart pressures.

## 2017-05-22 NOTE — EKG
[facility]

              [address] [place] [address]

Test Date:    2017               Test Time:    02:52:52

Pat Name:     JESSEE TOMPKINS        Department:   

Patient ID:   [id number]           Room:          

Gender:       F                        Technician:   

:          [id number]               Requested By: ANDREI LONDONO

Order Number: [id number]            Reading MD:     

                                 Measurements

Intervals                              Axis          

Rate:         109                      P:            13

MT:           134                      QRS:          14

QRSD:         122                      T:            71

QT:           370                                    

QTc:          500                                    

                           Interpretive Statements

SINUS TACHYCARDIA

QRS(T) CONTOUR ABNORMALITY

CONSIDER ANTEROSEPTAL MYOCARDIAL DAMAGE

RI6.01          Unconfirmed report

No previous ECG available for comparison

## 2017-05-22 NOTE — DS
DATE OF DISCHARGE:  2017



CHIEF COMPLAINT:  Shortness of breath.



HISTORY OF PRESENT ILLNESS:  This is a 36-year-old female patient with prior

history of recent diagnosis of preeclampsia and post-partum cardiomyopathy with

ejection fraction 10%-15%, who was discharged from the hospital in the past week

and presented back to the hospital with acute shortness of breath.  Symptoms

started early this morning.  The patient tried using inhalers at home; however,

her symptoms did not improve ____.  She denies any chest pain, palpitations or

leg swelling.  She says that she is compliant with her diet and medications, but

she is using a lot of fluids recently, which could be a contributing factor.



Symptoms improved with IV Lasix in the ER.



PAST MEDICAL HISTORY:  Hypertension, preeclampsia, post-partum cardiomyopathy,

asthma, and allergic rhinitis.



PAST SURGICAL HISTORY:  C section, hernia repair, and tubal ligation.



FAMILY HISTORY:  Coronary artery disease.



SOCIAL HISTORY:  No smoking.  Occasionally takes alcohol.



ALLERGIES:  NKDA.



LABORATORY FINDINGS:  CBC within normal limits.  Chemistry within normal limits,

except for BUN is 10 and creatinine is 1.2.  Glucose is 128.  Urinalysis:  ____

nitrites negative, and leukocyte esterase negative.  Chest x-ray:  Unchanged

cardiomegaly, no acute cardiopulmonary process is seen.



ASSESSMENT AND PLAN:

1.  Possible exacerbation of systolic congestive heart failure.

2.  Cardiomyopathy - nonischemic.  Left ventricular ejection fraction 10%-15%.

3.  Recent diagnosis of postpartum preeclampsia.

4.  Morbid obesity and hypertension.

5.  Recent history of  section in 2017.



BRIEF HOSPITAL COURSE:  This is a 36-year-old female, admitted to the hospital

for acute shortness of breath and she received IV Lasix in the ER and her labs

including two sets of troponins were normal.  Allergic to _____ medications. 

She has been evaluated by cardiology team and recommended the patient to take

home medications with fluid ____ at least 2 liters per day and to keep her

appointment with Cardiology.



DISCHARGE DISPOSITION:  Home.



DISCHARGE CONDITION:  Stable.



FOLLOWUP:  With Cardiology.



MEDICATIONS:  No new changes.  Continue current medications per ____ guide and

discharge instructions.



DISCHARGE DIET:  Low-salt diet.



Total time spent for H and P and discharge summary is 55 minutes.

 



______________________________

SANCHEZ AQUINO MD



DR:  RE[employer]/nic  JOB#:  [id number] / [id number]

DD:  2017 16:33  DT:  2017 18:59

## 2017-05-22 NOTE — ACF
Admission Forms Criteria


                                                 HEART FAILURE





Clinical Indications for Admission to Inpatient Care


 


                                                          (Place 'X' for any 

and all applicable criteria):





Admission is indicated by ANY ONE of the following(1)(2)(3)(4):


[ ]I.     Severe electrolyte abnormalities requiring inpatient care(9)


[ ]II.    Hemodynamic instability 


[ ]III.   Anasarca 


[ ]IV.  Acute cardiac ischemia causing or associated with failure (Also use 

Angina or Myocardial Infarction as appropriate)


[ ]V.   Cardiac arrhythmias of immediate concern


[ ]VI.  Precipitating cause for acute decompensation (eg, pneumonia, pulmonary 

embolism) requires inpatient care


[ ]VII. Pulmonary edema that is very severe (eg, mechanical ventilation needed, 

imminent or likely, need for 100% oxygen to keep 


         oxygen saturation above 90%)


[ ]VIII. Inpatient admission required rather than observation care (Also use 

Heart Failure: Observation Care as 


         appropriate) because of ANY ONE of the following:


          [ ]a)   Pulmonary edema that is severe or worsening as indicated by 

ALL of the following:


                   [ ]i)   New need for oxygen therapy to keep oxygen 

saturation above 90% (or increased FiO2 need from baseline)


                   [ ]ii)  Has not improved sufficiently with emergency 

department or observation care IV diuretics or other heart failure treatments[C]


          [ ]b)   Cognitive impairment that is severe or persistent


          [ ]c)   Increased creatinine (new on laboratory test) with reduction 

of more than 50% in estimated glomerular filtration rate from baseline.


          [ ]d)   Acute renal insufficiency (progressively (ongoing) rising 

creatinine (known from past laboratory test) 


                   with reduction of more than 25% in estimated glomerular 

filtration rate from baseline)


          [ ]e)   Acute peripheral ischemia (eg, pulseless, cool, mottled, or 

cyanotic extremity)


          [ ]f)    Acute renal failure     


          [ ]g)   Supplemental O2 or respiratory treatment for >24 hr that are 

performable only in acute inpatient setting


          [ ]h)   Pulmonary artery catheter monitoring


          [ ]i)    Other condition, treatment or monitoring requiring inpatient 

admission





[X]IX.   Contraindications and/or Inappropriate clinical situations for 

Observational Care in patients


          with  Heart Failure, when ANY ONE of the following is required:


          [ ]a)    Patient with High risk of cardiac embolism (e.g, patients 

with previous cardiac embolism, LVEF <


                   40%, age >75 and patients with prosthetic valve) 18


          [ ]b)   Patient with Moderate risk including DM patient, CAD and 

patient aged 65-75 


          [ ]c)   Patient with any change in cardiac biomarker especially 

troponin should be managed as high risk in an inpatient setting 19


          [ ]d)   Physician judgement irrespective of ECG and other diagnostic 

findings 20


          [ ]e)   Patients with hyponatremia have high risk for mortality and 

require more extensive care and length of stay 21


          [X]f)    Need for large volume diuresis 21


          [ ]g)   Presence of renal insufficiency or hypotension limiting speed 

of diuresis 21


          [ ]h)   Acute cardiac Ischemia in the elderly 21


          [ ]i)    Patients with a 30 day risk of mortality based on a 

multidimensional prognostic index (MPI) [J,]21


[ ]X.    General contraindications and/or Inappropriate clinical situations for 

Observational Care in patients


          with Heart Failure, when ANY ONE of the following is required:


           [ ]a)   Prediction of prolongation of LOS based on ANY ONE of the 

following may be considered as 


                   a contraindication for observational care 2, 3, 4, 5, 6, 7, 8

, 9, 10, 11


                   [ ]i)    Age > 65 yrs.


                   [ ]ii)   Patient arriving by ambulance


                   [ ]iii)  Patient with high acuity


                   [ ]iv)  Patient requiring vital sign monitoring


                   [ ]v)   Patient on IV medication


           [ ]b)  Systolic blood pressures  180mmHg 3,12


           [ ]c)  Patient with altered mental status including delirium and 

other alteration of consciousness, (3)


           [ ]d)  Patient whose discharge disposition will be to a skilled 

nursing home or rehabilitation home should 


                   not be managed in Emergency Department Observation Unit. CMS 

rule requires 3 days hospital stay before such placement.3,13


           [ ]e)  Patient with failure to thrive due to broad array of 

etiologies 3,16,17


           [ ]f)  Inability to ambulate 3,14








Extended stay beyond goal length of stay may be needed for(1)(3)(21)(25):


[ ]a)    Cardiac ischemia, confirmed or suspected as precipitant 


[ ]b)    Cardiogenic shock or refractory pulmonary edema 


[ ]c)    Acute kidney injury or renal failure 


[ ]d)    Respiratory failure (eg, need for noninvasive or invasive mechanical 

ventilation) (23)


[ ]e)    Concomitant pneumonia or significant electrolyte abnormality (eg, 

severe hyponatremia)


[ ]f)     Newly diagnosed (new onset) atrial fibrillation        


[ ]g)    Stage IV chronic kidney disease (estimated glomerular filtration rate 

of less than 30 mL/min/1.73m2 


          (0.50 mL/sec/1.73m2), and not previously on chronic dialysis     








The original [facility] content created by [facility] has been revised. The portions of the


content which have been revised are identified through the use of italic text 

or in bold, and [facility] has neither      


reviewed nor approved the modified material. All other unmodified content is 

copyright  [facility].





Please see references footnoted in the original [facility] edition 

2016


Admission Criteria Met?:  Yes











EMILY ROBLERO May 22, 2017 04:47

## 2017-05-22 NOTE — PHYS DOC
Past Medical History


Past Medical History:  Asthma, Hypertension


Past Surgical History:  , Other


Additional Past Surgical Histo:  HERNIA REPAIR


Alcohol Use:  None


Drug Use:  None





Adult General


Chief Complaint


Chief Complaint:  SHORTNESS OF BREATH





HPI


HPI


36-year-old female who had recent admission for postpartum cardiomyopathy with 

a severely reduced EF of 10% is now presenting again with orthopnea and 

shortness of air approximately one hour prior to arrival. She does also 

complain of some pleuritic type chest pain. Upon arrival, patient was 

saturating in the mid to upper 80s and placed on nasal cannula. Patient reports 

that she is 6-8 weeks postpartum. Her last pregnancy was complicated by 

preeclampsia that required an early  by 3 weeks. Patient currently is 

taking Lasix and blood pressure medication. She states she is compliant with 

these medications. She denies any recent cough or fever.





Review of Systems


Review of Systems





Constitutional: Denies fever or chills []


Eyes: Denies change in visual acuity, redness, or eye pain []


HENT: Denies nasal congestion or sore throat []


Respiratory: Denies cough, has shortness of breath []


Cardiovascular: No additional information not addressed in HPI []


GI: Denies abdominal pain, nausea, vomiting, bloody stools or diarrhea []


: Denies dysuria or hematuria []


Musculoskeletal: Denies back pain or joint pain []


Integument: Denies rash or skin lesions []


Neurologic: Denies headache, focal weakness or sensory changes []


Endocrine: Denies polyuria or polydipsia []





Current Medications


Current Medications





Current Medications








 Medications


  (Trade)  Dose


 Ordered  Sig/Skye  Start Time


 Stop Time Status Last Admin


Dose Admin


 


 Albuterol/


 Ipratropium


  (Duoneb)  3 ml  1X  ONCE  17 03:00


 17 03:01 DC 17 03:09


3 ML


 


 Furosemide


  (Lasix)  40 mg  1X  ONCE  17 03:15


 17 03:16 DC 17 03:29


40 MG











Allergies


Allergies





Allergies








Coded Allergies Type Severity Reaction Last Updated Verified


 


  No Known Drug Allergies    17 No











Physical Exam


Physical Exam





Constitutional: Well developed, well nourished, no acute distress, non-toxic 

appearance. []


HENT: Normocephalic, atraumatic, bilateral external ears normal, oropharynx 

moist, no oral exudates, nose normal. []


Eyes: PERRLA, EOMI, conjunctiva normal, no discharge. [] 


Neck: Normal range of motion, no tenderness, supple, no stridor. [] 


Cardiovascular:Heart rate regular rhythm, no murmur []


Lungs & Thorax:  Diminished bilaterally []


Abdomen: Bowel sounds normal, soft, no tenderness, no masses, no pulsatile 

masses. [] 


Skin: Warm, dry, no erythema, no rash. [] 


Back: No tenderness, no CVA tenderness. [] 


Extremities: No tenderness, no cyanosis, no clubbing, ROM intact, no edema. [] 


Neurologic: Alert and oriented X 3, normal motor function, normal sensory 

function, no focal deficits noted. []


Psychologic: Affect normal, judgement normal, mood normal. []





Current Patient Data


Vital Signs





 Vital Signs








  Date Time  Temp Pulse Resp B/P (MAP) Pulse Ox O2 Delivery O2 Flow Rate FiO2


 


17 03:02     97 Nasal Cannula 3.0 


 


17 02:50 98.4 119 24 119/68 (85)    





 98.4       








Lab Values





 Laboratory Tests








Test


  17


02:01 17


02:46 17


02:50


 


POC Urine HCG, Qualitative


  Hcg negative


(Negative) 


  


 


 


Urine Collection Type  Unknown   


 


Urine Color  Yellow   


 


Urine Clarity  Clear   


 


Urine pH  5.5   


 


Urine Specific Gravity  1.020   


 


Urine Protein


  


  30 mg/dL


(NEG-TRACE) 


 


 


Urine Glucose (UA)


  


  Negative mg/dL


(NEG) 


 


 


Urine Ketones (Stick)


  


  Negative mg/dL


(NEG) 


 


 


Urine Blood


  


  Negative (NEG)


  


 


 


Urine Nitrite


  


  Negative (NEG)


  


 


 


Urine Bilirubin


  


  Negative (NEG)


  


 


 


Urine Urobilinogen Dipstick


  


  1.0 mg/dL (0.2


mg/dL) 


 


 


Urine Leukocyte Esterase


  


  Negative (NEG)


  


 


 


Urine RBC


  


  Occ /HPF (0-2)


  


 


 


Urine WBC


  


  Occ /HPF (0-4)


  


 


 


Urine Squamous Epithelial


Cells 


  Mod /LPF  


  


 


 


Urine Bacteria


  


  Few /HPF


(0-FEW) 


 


 


Urine Hyaline Casts  Few /HPF   


 


Urine Mucus  Mod /LPF   


 


White Blood Count


  


  


  11.2 x10^3/uL


(4.0-11.0)  H


 


Red Blood Count


  


  


  4.77 x10^6/uL


(3.50-5.40)


 


Hemoglobin


  


  


  11.9 g/dL


(12.0-15.5)  L


 


Hematocrit


  


  


  38.4 %


(36.0-47.0)


 


Mean Corpuscular Volume


  


  


  81 fL ()


 


 


Mean Corpuscular Hemoglobin   25 pg (25-35)  


 


Mean Corpuscular Hemoglobin


Concent 


  


  31 g/dL


(31-37)


 


Red Cell Distribution Width


  


  


  17.9 %


(11.5-14.5)  H


 


Platelet Count


  


  


  297 x10^3/uL


(140-400)


 


Neutrophils (%) (Auto)   49 % (31-73)  


 


Lymphocytes (%) (Auto)   43 % (24-48)  


 


Monocytes (%) (Auto)   6 % (0-9)  


 


Eosinophils (%) (Auto)   1 % (0-3)  


 


Basophils (%) (Auto)   1 % (0-3)  


 


Neutrophils # (Auto)


  


  


  5.5 x10^3uL


(1.8-7.7)


 


Lymphocytes # (Auto)


  


  


  4.8 x10^3/uL


(1.0-4.8)


 


Monocytes # (Auto)


  


  


  0.7 x10^3/uL


(0.0-1.1)


 


Eosinophils # (Auto)


  


  


  0.1 x10^3/uL


(0.0-0.7)


 


Basophils # (Auto)


  


  


  0.1 x10^3/uL


(0.0-0.2)


 


Sodium Level


  


  


  140 mmol/L


(136-145)


 


Potassium Level


  


  


  3.5 mmol/L


(3.5-5.1)


 


Chloride Level


  


  


  105 mmol/L


()


 


Carbon Dioxide Level


  


  


  25 mmol/L


(21-32)


 


Anion Gap   10 (6-14)  


 


Blood Urea Nitrogen


  


  


  18 mg/dL


(7-20)


 


Creatinine


  


  


  1.2 mg/dL


(0.6-1.0)  H


 


Estimated GFR


(Cockcroft-Gault) 


  


  50.6  


 


 


Glucose Level


  


  


  128 mg/dL


(70-99)  H


 


Calcium Level


  


  


  9.0 mg/dL


(8.5-10.1)


 


Troponin I Quantitative


  


  


  < 0.017 ng/mL


(0.000-0.055)


 


NT-Pro-B-Type Natriuretic


Peptide 


  


  912 pg/mL


(0-124)  H





 Laboratory Tests


17 02:50








 Laboratory Tests


17 02:50











EKG


EKG


EKG as interpreted by me shows a sinus tachycardia with rate 109 bpm. QTc 

interval is prolonged at 500 ms. There is no acute injury pattern seen. There 

does appear to be a degree of LVH.





Radiology/Procedures


Radiology/Procedures


One view of the chest as interpreted by me reveals moderate cardiomegaly but no 

acute process.





Course & Med Decision Making


Course & Med Decision Making


Pertinent Labs and Imaging studies reviewed. (See chart for details)





36-year-old female with history of significant postpartum cardiomyopathy with a 

severely reduced EF will be likely be admitted to the hospital. Doses of IV 

Lasix will be given and breathing treatment. After workup indicates a slightly 

elevated BNP of 912. Rest her laboratory workup was unremarkable. Her EKG and 

chest x-ray were also fairly unremarkable besides a persisting sinus 

tachycardia. Patient is still requiring supplementary oxygen and will be 

admitted to the hospital for likely CHF exacerbation. She is still profoundly 

orthopneic and has inability to lie flat. Patient was given IV Lasix and a 

breathing treatment. I will discuss the need to admit the patient with the 

hospitalist, Dr. Paz, as well as place a cardiology consult and repeat 

troponins for the floor.





Dragon Disclaimer


Dragon Disclaimer


This electronic medical record was generated, in whole or in part, using a 

voice recognition dictation system.





Departure


Departure


Impression:  


 Primary Impression:  


 CHF exacerbation


Disposition:   ADMITTED AS INPATIENT


Admitting Physician:  Batsheva Paz


Condition:  STABLE


Referrals:  


UNKNOWN PCP NAME (PCP)











ANDREI LONDONO DO May 22, 2017 02:46

## 2017-05-22 NOTE — PDOC1
History and Physical


Past Medical History


Cardiovascular:  HTN


Pulmonary:  Asthma


CENTRAL NERVOUS SYSTEM:  Other


GI:  No pertinent hx


Heme/Onc:  No pertinent hx


Hepatobiliary:  No pertinent hx


Psych:  No pertinent hx


Rheumatologic:  No pertinent hx


Infectious disease:  No pertinent hx


Renal/:  No pertinent hx


Endocrine:  No pertinent hx





Past Surgical History


Past Surgical History:  , Hernia Repair, Tubal Ligation, Other





Family History


Family History:  Coronary Artery Disease





Social History


ALCOHOL:  occassional


Drugs:  None





Current Medications


Current Medications





Current Medications








 Medications


  (Trade)  Dose


 Ordered  Sig/Skye  Start Time


 Stop Time Status Last Admin


Dose Admin


 


 Albuterol/


 Ipratropium


  (Duoneb)  3 ml  RTQID  17 08:00


 17 07:59  17 08:00


3 ML


 


 Furosemide


  (Lasix)  40 mg  1X  ONCE  17 03:15


 17 03:16 DC 17 03:29


40 MG


 


 Ondansetron HCl


  (Zofran)  4 mg  PRN Q8HRS  PRN  17 04:15


 17 04:14   


 











Allergies


Allergies





Allergies








Coded Allergies Type Severity Reaction Last Updated Verified


 


  No Known Drug Allergies    17 No











ROS


Review of System


CONSTITUTIONAL:        No fever or chills


EYES:                          No recent changes


SKIN:               No rash or itching


CARDIOVASCULAR:     No chest pain, syncope, palpitations, or edema


RESPIRATORY:            No SOB or cough


GASTROINTESTINAL:    No nausea, vomiting or abdominal pain


NEUROLOGICAL:          No headaches or weakness


ENDOCRINE:               No cold or heat intolerance


GENITOURINARY:         No urgency or frequency of urination


MUSCULOSKELETAL:   No back pain or joint pain


LYMPHATICS:               No enlarged lymph nodes


PSYCHIATRIC:              No anxiety or depression





Physical Exam


Physical Exam


GEN.:    No apparent distress.  Alert and oriented.


HEENT:    Head is normocephalic, atraumatic


NECK:    Supple.  


LUNGS:    Clear to auscultation.


HEART:    RRR, S1, S2 present.  Peripheral pulses intact


ABDOMEN:    Soft, nontender.  Positive bowel sounds.


EXTREMITIES:    Without any cyanosis.    


NEUROLOGIC:     Normal speech, normal tone


PSYCHIATRIC:    Normal affect, normal mood.


SKIN:   No ulcerations





Vitals


Vitals





Vital Signs








  Date Time  Temp Pulse Resp B/P (MAP) Pulse Ox O2 Delivery O2 Flow Rate FiO2


 


17 08:54     98 Nasal Cannula 2.5 


 


17 07:00 98.2 95 20 93/54 (67)    





 98.2       











Labs


Labs





Laboratory Tests








Test


  17


02:01 17


02:46 17


02:50


 


Bedside Urine HCG, Qualitative


  Hcg negative


(Negative) 


  


 


 


Urine Collection Type  Unknown  


 


Urine Color  Yellow  


 


Urine Clarity  Clear  


 


Urine pH  5.5  


 


Urine Specific Gravity  1.020  


 


Urine Protein


  


  30 mg/dL


(NEG-TRACE) 


 


 


Urine Glucose (UA)


  


  Negative mg/dL


(NEG) 


 


 


Urine Ketones (Stick)


  


  Negative mg/dL


(NEG) 


 


 


Urine Blood  Negative (NEG)  


 


Urine Nitrite  Negative (NEG)  


 


Urine Bilirubin  Negative (NEG)  


 


Urine Urobilinogen Dipstick


  


  1.0 mg/dL (0.2


mg/dL) 


 


 


Urine Leukocyte Esterase  Negative (NEG)  


 


Urine RBC  Occ /HPF (0-2)  


 


Urine WBC  Occ /HPF (0-4)  


 


Urine Squamous Epithelial


Cells 


  Mod /LPF 


  


 


 


Urine Bacteria


  


  Few /HPF


(0-FEW) 


 


 


Urine Hyaline Casts  Few /HPF  


 


Urine Mucus  Mod /LPF  


 


White Blood Count


  


  


  11.2 x10^3/uL


(4.0-11.0)


 


Red Blood Count


  


  


  4.77 x10^6/uL


(3.50-5.40)


 


Hemoglobin


  


  


  11.9 g/dL


(12.0-15.5)


 


Hematocrit


  


  


  38.4 %


(36.0-47.0)


 


Mean Corpuscular Volume   81 fL () 


 


Mean Corpuscular Hemoglobin   25 pg (25-35) 


 


Mean Corpuscular Hemoglobin


Concent 


  


  31 g/dL


(31-37)


 


Red Cell Distribution Width


  


  


  17.9 %


(11.5-14.5)


 


Platelet Count


  


  


  297 x10^3/uL


(140-400)


 


Neutrophils (%) (Auto)   49 % (31-73) 


 


Lymphocytes (%) (Auto)   43 % (24-48) 


 


Monocytes (%) (Auto)   6 % (0-9) 


 


Eosinophils (%) (Auto)   1 % (0-3) 


 


Basophils (%) (Auto)   1 % (0-3) 


 


Neutrophils # (Auto)


  


  


  5.5 x10^3uL


(1.8-7.7)


 


Lymphocytes # (Auto)


  


  


  4.8 x10^3/uL


(1.0-4.8)


 


Monocytes # (Auto)


  


  


  0.7 x10^3/uL


(0.0-1.1)


 


Eosinophils # (Auto)


  


  


  0.1 x10^3/uL


(0.0-0.7)


 


Basophils # (Auto)


  


  


  0.1 x10^3/uL


(0.0-0.2)


 


Sodium Level


  


  


  140 mmol/L


(136-145)


 


Potassium Level


  


  


  3.5 mmol/L


(3.5-5.1)


 


Chloride Level


  


  


  105 mmol/L


()


 


Carbon Dioxide Level


  


  


  25 mmol/L


(21-32)


 


Anion Gap   10 (6-14) 


 


Blood Urea Nitrogen


  


  


  18 mg/dL


(7-20)


 


Creatinine


  


  


  1.2 mg/dL


(0.6-1.0)


 


Estimated GFR


(Cockcroft-Gault) 


  


  50.6 


 


 


Glucose Level


  


  


  128 mg/dL


(70-99)


 


Calcium Level


  


  


  9.0 mg/dL


(8.5-10.1)


 


Troponin I Quantitative


  


  


  < 0.017 ng/mL


(0.000-0.055)


 


NT-Pro-B-Type Natriuretic


Peptide 


  


  912 pg/mL


(0-124)








Laboratory Tests








Test


  17


02:01 17


02:46 17


02:50


 


Bedside Urine HCG, Qualitative


  Hcg negative


(Negative) 


  


 


 


Urine Collection Type  Unknown  


 


Urine Color  Yellow  


 


Urine Clarity  Clear  


 


Urine pH  5.5  


 


Urine Specific Gravity  1.020  


 


Urine Protein


  


  30 mg/dL


(NEG-TRACE) 


 


 


Urine Glucose (UA)


  


  Negative mg/dL


(NEG) 


 


 


Urine Ketones (Stick)


  


  Negative mg/dL


(NEG) 


 


 


Urine Blood  Negative (NEG)  


 


Urine Nitrite  Negative (NEG)  


 


Urine Bilirubin  Negative (NEG)  


 


Urine Urobilinogen Dipstick


  


  1.0 mg/dL (0.2


mg/dL) 


 


 


Urine Leukocyte Esterase  Negative (NEG)  


 


Urine RBC  Occ /HPF (0-2)  


 


Urine WBC  Occ /HPF (0-4)  


 


Urine Squamous Epithelial


Cells 


  Mod /LPF 


  


 


 


Urine Bacteria


  


  Few /HPF


(0-FEW) 


 


 


Urine Hyaline Casts  Few /HPF  


 


Urine Mucus  Mod /LPF  


 


White Blood Count


  


  


  11.2 x10^3/uL


(4.0-11.0)


 


Red Blood Count


  


  


  4.77 x10^6/uL


(3.50-5.40)


 


Hemoglobin


  


  


  11.9 g/dL


(12.0-15.5)


 


Hematocrit


  


  


  38.4 %


(36.0-47.0)


 


Mean Corpuscular Volume   81 fL () 


 


Mean Corpuscular Hemoglobin   25 pg (25-35) 


 


Mean Corpuscular Hemoglobin


Concent 


  


  31 g/dL


(31-37)


 


Red Cell Distribution Width


  


  


  17.9 %


(11.5-14.5)


 


Platelet Count


  


  


  297 x10^3/uL


(140-400)


 


Neutrophils (%) (Auto)   49 % (31-73) 


 


Lymphocytes (%) (Auto)   43 % (24-48) 


 


Monocytes (%) (Auto)   6 % (0-9) 


 


Eosinophils (%) (Auto)   1 % (0-3) 


 


Basophils (%) (Auto)   1 % (0-3) 


 


Neutrophils # (Auto)


  


  


  5.5 x10^3uL


(1.8-7.7)


 


Lymphocytes # (Auto)


  


  


  4.8 x10^3/uL


(1.0-4.8)


 


Monocytes # (Auto)


  


  


  0.7 x10^3/uL


(0.0-1.1)


 


Eosinophils # (Auto)


  


  


  0.1 x10^3/uL


(0.0-0.7)


 


Basophils # (Auto)


  


  


  0.1 x10^3/uL


(0.0-0.2)


 


Sodium Level


  


  


  140 mmol/L


(136-145)


 


Potassium Level


  


  


  3.5 mmol/L


(3.5-5.1)


 


Chloride Level


  


  


  105 mmol/L


()


 


Carbon Dioxide Level


  


  


  25 mmol/L


(21-32)


 


Anion Gap   10 (6-14) 


 


Blood Urea Nitrogen


  


  


  18 mg/dL


(7-20)


 


Creatinine


  


  


  1.2 mg/dL


(0.6-1.0)


 


Estimated GFR


(Cockcroft-Gault) 


  


  50.6 


 


 


Glucose Level


  


  


  128 mg/dL


(70-99)


 


Calcium Level


  


  


  9.0 mg/dL


(8.5-10.1)


 


Troponin I Quantitative


  


  


  < 0.017 ng/mL


(0.000-0.055)


 


NT-Pro-B-Type Natriuretic


Peptide 


  


  912 pg/mL


(0-124)











VTE Prophylaxis Ordered


VTE Prophylaxis Devices:  Yes











SANCHEZ AQUINO MD May 22, 2017 09:53

## 2017-05-22 NOTE — RAD
Portable chest, 5/22/2017:



History: Shortness of breath



Comparison is made to a study from 5/12/2017. The heart is enlarged. The

pulmonary vascularity is normal. No pulmonary infiltrates are seen. There is

no evidence of pleural fluid.



IMPRESSION:

1. Unchanged cardiomegaly.

2. No acute abnormality is detected.

## 2017-05-22 NOTE — PDOC2
CARDIAC CONSULT


DATE OF CONSULT


Date of Consult


DATE: 17 


TIME: 09:57





REASON FOR CONSULT


Reason for Consult:


CHF Exacerbation





REFERRING PHYSICIAN


Referring Physician:


Dr. Keenan





SOURCE


Source:  Chart review, Patient





HISTORY OF PRESENT ILLNESS


HISTORY OF PRESENT ILLNESS


This is a 37 yo female, with a history of preeclampsia and post-partum 

cardiomyopathy (LVEF 10-15%) diagnosed earlier this month, who presented with 

complaints of shortness of breath. Patient reports symptoms began around 1am 

this morning. Reports laying down in bed and feeling significantly short of 

air. Took breathing treat without any significant relief. Symptoms persisted so 

she came into the ED for further evaluation. Reports compliance with Lasix and 

Na restricted diet. Has not been restricting/monitoring fluid and has been 

drinking "lots" of fluids.





PAST MEDICAL HISTORY


Past Medical History


Cardiovascular:  HTN (preeclampsia), post-partum cardiomyopathy 


Pulmonary:  Asthma


CENTRAL NERVOUS SYSTEM:  Other (No pertinent history)


GI:  No pertinent hx


Heme/Onc:  No pertinent hx


Hepatobiliary:  No pertinent hx


Psych:  No pertinent hx


Musculoskeletal:  Other (None)


Rheumatologic:  No pertinent hx


Infectious disease:  No pertinent hx


ENT:  Allergic Rhinitis


Renal/:  No pertinent hx


Endocrine:  No pertinent hx


Dermatology:  No pertinent hx


Grav:  5


Para:  4





PAST SURGICAL HISTORY


Past Surgical History


, Hernia Repair, Tubal Ligation, Other (1 miscarriage)





FAMILY HISTORY


Family History:  Coronary Artery Disease





SOCIAL HISTORY


Social History


Smoke:  No


ALCOHOL:  occassional


Drugs:  None


Lives:  with Family





CURRENT MEDICATIONS


CURRENT MEDICATIONS





Current Medications








 Medications


  (Trade)  Dose


 Ordered  Sig/Skye


 Route


 PRN Reason  Start Time


 Stop Time Status Last Admin


Dose Admin


 


 Albuterol/


 Ipratropium


  (Duoneb)  3 ml  1X  ONCE


 NEB


   17 03:00


 17 03:01 DC 17 03:09


 


 


 Furosemide


  (Lasix)  40 mg  1X  ONCE


 IVP


   17 03:15


 17 03:16 DC 17 03:29


 


 


 Albuterol/


 Ipratropium


  (Duoneb)  3 ml  RTQID


 NEB


   17 08:00


 17 07:59  17 08:00


 











ALLERGIES


ALLERGIES:  


Coded Allergies:  


     No Known Drug Allergies (Unverified , 17)





ROS


Review of System


14 point ROS conducted with pertinent positives noted above in HPI.





PHYSICAL EXAM


PHYSICAL EXAM


General:  Alert, Oriented X3, Cooperative, No acute distress


HEENT:  Atraumatic, Mucous membr. moist/pink


Lungs:  Clear to auscultation, Normal air movement


Heart:  Regular rate (tele: SR LBBB), Normal S1, Normal S2,


Abdomen:  Soft, No tenderness


Extremities:  No cyanosis, No edena 


Skin:  No breakdown, No significant lesion


Neuro:  Normal speech, Sensation intact


Psych/Mental Status:  Mental status NL, Mood NL


MUSCULOSKELETAL:  Full range of motion without pain





VITALS


VITALS





Vital Signs








  Date Time  Temp Pulse Resp B/P (MAP) Pulse Ox O2 Delivery O2 Flow Rate FiO2


 


17 08:54     98 Nasal Cannula 2.5 


 


17 07:00 98.2 95 20 93/54 (67)    





 98.2       











LABS


Lab:





Laboratory Tests








Test


  17


02:01 17


02:46 17


02:50


 


Bedside Urine HCG, Qualitative


  Hcg negative


(Negative) 


  


 


 


Urine Collection Type  Unknown  


 


Urine Color  Yellow  


 


Urine Clarity  Clear  


 


Urine pH  5.5  


 


Urine Specific Gravity  1.020  


 


Urine Protein


  


  30 mg/dL


(NEG-TRACE) 


 


 


Urine Glucose (UA)


  


  Negative mg/dL


(NEG) 


 


 


Urine Ketones (Stick)


  


  Negative mg/dL


(NEG) 


 


 


Urine Blood  Negative (NEG)  


 


Urine Nitrite  Negative (NEG)  


 


Urine Bilirubin  Negative (NEG)  


 


Urine Urobilinogen Dipstick


  


  1.0 mg/dL (0.2


mg/dL) 


 


 


Urine Leukocyte Esterase  Negative (NEG)  


 


Urine RBC  Occ /HPF (0-2)  


 


Urine WBC  Occ /HPF (0-4)  


 


Urine Squamous Epithelial


Cells 


  Mod /LPF 


  


 


 


Urine Bacteria


  


  Few /HPF


(0-FEW) 


 


 


Urine Hyaline Casts  Few /HPF  


 


Urine Mucus  Mod /LPF  


 


White Blood Count


  


  


  11.2 x10^3/uL


(4.0-11.0)


 


Red Blood Count


  


  


  4.77 x10^6/uL


(3.50-5.40)


 


Hemoglobin


  


  


  11.9 g/dL


(12.0-15.5)


 


Hematocrit


  


  


  38.4 %


(36.0-47.0)


 


Mean Corpuscular Volume   81 fL () 


 


Mean Corpuscular Hemoglobin   25 pg (25-35) 


 


Mean Corpuscular Hemoglobin


Concent 


  


  31 g/dL


(31-37)


 


Red Cell Distribution Width


  


  


  17.9 %


(11.5-14.5)


 


Platelet Count


  


  


  297 x10^3/uL


(140-400)


 


Neutrophils (%) (Auto)   49 % (31-73) 


 


Lymphocytes (%) (Auto)   43 % (24-48) 


 


Monocytes (%) (Auto)   6 % (0-9) 


 


Eosinophils (%) (Auto)   1 % (0-3) 


 


Basophils (%) (Auto)   1 % (0-3) 


 


Neutrophils # (Auto)


  


  


  5.5 x10^3uL


(1.8-7.7)


 


Lymphocytes # (Auto)


  


  


  4.8 x10^3/uL


(1.0-4.8)


 


Monocytes # (Auto)


  


  


  0.7 x10^3/uL


(0.0-1.1)


 


Eosinophils # (Auto)


  


  


  0.1 x10^3/uL


(0.0-0.7)


 


Basophils # (Auto)


  


  


  0.1 x10^3/uL


(0.0-0.2)


 


Sodium Level


  


  


  140 mmol/L


(136-145)


 


Potassium Level


  


  


  3.5 mmol/L


(3.5-5.1)


 


Chloride Level


  


  


  105 mmol/L


()


 


Carbon Dioxide Level


  


  


  25 mmol/L


(21-32)


 


Anion Gap   10 (6-14) 


 


Blood Urea Nitrogen


  


  


  18 mg/dL


(7-20)


 


Creatinine


  


  


  1.2 mg/dL


(0.6-1.0)


 


Estimated GFR


(Cockcroft-Gault) 


  


  50.6 


 


 


Glucose Level


  


  


  128 mg/dL


(70-99)


 


Calcium Level


  


  


  9.0 mg/dL


(8.5-10.1)


 


Troponin I Quantitative


  


  


  < 0.017 ng/mL


(0.000-0.055)


 


NT-Pro-B-Type Natriuretic


Peptide 


  


  912 pg/mL


(0-124)











ECHOCARDIOGRAM


ECHOCARDIOGRAM


<Conclusion>


Left ventricle systolic function is severely impaired. The Ejection Fraction is 

10-15%.


There is severe global hypokinesis of the left ventricle.





DATE:     17 0958





HEART CATH


HEART CATH


FINDINGS


1.  Hemodynamics: Left ventricular end-diastolic pressure of 27 mmHg.  No 

pullback gradient across the aortic valve.


2.  Left ventriculography: Severe global left ventricular systolic dysfunction 

with ejection fraction estimated at 15-20%.  No significant mitral 

regurgitation seen.


3.  Coronary angiography:


a.  The left main coronary artery arose from the left sinus of Valsalva, gave 

rise to the left anterior descending and left circumflex arteries and did not 

show any significant stenosis.


b.  The left anterior descending artery did not show any significant stenosis.


c.  The left circumflex artery did not show any significant stenosis.


d.  The right coronary artery was a large and dominant vessel arising from the 

right sinus of Valsalva that did not show any significant stenosis.





Conclusion


1.  No significant coronary artery disease


2.  Severe global left ventricular systolic dysfunction with ejection fraction 

estimated at 15-20%





DATE:     17 1305





ASSESSMENT/PLAN


ASSESSMENT/PLAN


1.  Acute on chronic systolic CHF 


2.  Cardiomyopathy, non-ischemic: LVEF 10-15%


3.  Dyspnea


4.  Postpartum: early delivery via C section 4/3/2017


5.  Hx of preeclampsia with recent pregnancy


6.  Morbid obesity


7.  HTN











Recommendations 





Continue optimization therapy. 


Compensated from HF standpoint; continue routine oral Lasix. 


2000cc FR. 2Gm Na diet; d/w patient and mother


Echo in 3 months to re-evaluate LV function and assess need for ICD in primary 

prevention of SCD


Follow-up in our office with Dr. Hernandez as previously scheduled.


Problems:  











SARAHI MCPHERSON May 22, 2017 10:17

## 2019-12-17 ENCOUNTER — HOSPITAL ENCOUNTER (INPATIENT)
Dept: HOSPITAL 61 - ER | Age: 39
LOS: 1 days | Discharge: HOME | DRG: 758 | End: 2019-12-18
Attending: FAMILY MEDICINE | Admitting: FAMILY MEDICINE
Payer: COMMERCIAL

## 2019-12-17 VITALS — DIASTOLIC BLOOD PRESSURE: 64 MMHG | SYSTOLIC BLOOD PRESSURE: 116 MMHG

## 2019-12-17 VITALS — BODY MASS INDEX: 43.71 KG/M2 | HEIGHT: 64 IN | WEIGHT: 256 LBS

## 2019-12-17 VITALS — SYSTOLIC BLOOD PRESSURE: 119 MMHG | DIASTOLIC BLOOD PRESSURE: 64 MMHG

## 2019-12-17 DIAGNOSIS — Z82.49: ICD-10-CM

## 2019-12-17 DIAGNOSIS — I11.0: ICD-10-CM

## 2019-12-17 DIAGNOSIS — I50.22: ICD-10-CM

## 2019-12-17 DIAGNOSIS — I42.8: ICD-10-CM

## 2019-12-17 DIAGNOSIS — E66.01: ICD-10-CM

## 2019-12-17 DIAGNOSIS — J45.909: ICD-10-CM

## 2019-12-17 DIAGNOSIS — N76.0: Primary | ICD-10-CM

## 2019-12-17 DIAGNOSIS — R07.89: ICD-10-CM

## 2019-12-17 DIAGNOSIS — Z95.810: ICD-10-CM

## 2019-12-17 LAB
ALBUMIN SERPL-MCNC: 3.3 G/DL (ref 3.4–5)
ALBUMIN/GLOB SERPL: 0.8 {RATIO} (ref 1–1.7)
ALP SERPL-CCNC: 68 U/L (ref 46–116)
ALT SERPL-CCNC: 22 U/L (ref 14–59)
ANION GAP SERPL CALC-SCNC: 8 MMOL/L (ref 6–14)
APTT BLD: 35 SEC (ref 24–38)
APTT PPP: YELLOW S
AST SERPL-CCNC: 15 U/L (ref 15–37)
BACTERIA #/AREA URNS HPF: (no result) /HPF
BASOPHILS # BLD AUTO: 0 X10^3/UL (ref 0–0.2)
BASOPHILS NFR BLD: 0 % (ref 0–3)
BILIRUB SERPL-MCNC: 0.3 MG/DL (ref 0.2–1)
BILIRUB UR QL STRIP: NEGATIVE
BUN SERPL-MCNC: 12 MG/DL (ref 7–20)
BUN/CREAT SERPL: 12 (ref 6–20)
CALCIUM SERPL-MCNC: 8.8 MG/DL (ref 8.5–10.1)
CHLORIDE SERPL-SCNC: 105 MMOL/L (ref 98–107)
CK SERPL-CCNC: 128 U/L (ref 26–192)
CO2 SERPL-SCNC: 29 MMOL/L (ref 21–32)
CREAT SERPL-MCNC: 1 MG/DL (ref 0.6–1)
EOSINOPHIL NFR BLD: 0.2 X10^3/UL (ref 0–0.7)
EOSINOPHIL NFR BLD: 2 % (ref 0–3)
ERYTHROCYTE [DISTWIDTH] IN BLOOD BY AUTOMATED COUNT: 13.7 % (ref 11.5–14.5)
FIBRINOGEN PPP-MCNC: CLEAR MG/DL
GFR SERPLBLD BASED ON 1.73 SQ M-ARVRAT: 61.4 ML/MIN
GLOBULIN SER-MCNC: 4.3 G/DL (ref 2.2–3.8)
GLUCOSE SERPL-MCNC: 100 MG/DL (ref 70–99)
HCT VFR BLD CALC: 37 % (ref 36–47)
HGB BLD-MCNC: 12.1 G/DL (ref 12–15.5)
LYMPHOCYTES # BLD: 2.6 X10^3/UL (ref 1–4.8)
LYMPHOCYTES NFR BLD AUTO: 33 % (ref 24–48)
MCH RBC QN AUTO: 28 PG (ref 25–35)
MCHC RBC AUTO-ENTMCNC: 33 G/DL (ref 31–37)
MCV RBC AUTO: 87 FL (ref 79–100)
MONO #: 0.7 X10^3/UL (ref 0–1.1)
MONOCYTES NFR BLD: 9 % (ref 0–9)
NEUT #: 4.4 X10^3/UL (ref 1.8–7.7)
NEUTROPHILS NFR BLD AUTO: 56 % (ref 31–73)
NITRITE UR QL STRIP: NEGATIVE
PH UR STRIP: 5.5 [PH]
PLATELET # BLD AUTO: 228 X10^3/UL (ref 140–400)
POTASSIUM SERPL-SCNC: 3.5 MMOL/L (ref 3.5–5.1)
PROT SERPL-MCNC: 7.6 G/DL (ref 6.4–8.2)
PROT UR STRIP-MCNC: NEGATIVE MG/DL
PROTHROMBIN TIME: 12.8 SEC (ref 11.7–14)
RBC # BLD AUTO: 4.24 X10^6/UL (ref 3.5–5.4)
RBC #/AREA URNS HPF: (no result) /HPF (ref 0–2)
SODIUM SERPL-SCNC: 142 MMOL/L (ref 136–145)
SQUAMOUS #/AREA URNS LPF: (no result) /LPF
UROBILINOGEN UR-MCNC: 1 MG/DL
WBC # BLD AUTO: 7.9 X10^3/UL (ref 4–11)
WBC #/AREA URNS HPF: (no result) /HPF (ref 0–4)

## 2019-12-17 PROCEDURE — 85730 THROMBOPLASTIN TIME PARTIAL: CPT

## 2019-12-17 PROCEDURE — 96374 THER/PROPH/DIAG INJ IV PUSH: CPT

## 2019-12-17 PROCEDURE — 83735 ASSAY OF MAGNESIUM: CPT

## 2019-12-17 PROCEDURE — 87491 CHLMYD TRACH DNA AMP PROBE: CPT

## 2019-12-17 PROCEDURE — 82553 CREATINE MB FRACTION: CPT

## 2019-12-17 PROCEDURE — G0378 HOSPITAL OBSERVATION PER HR: HCPCS

## 2019-12-17 PROCEDURE — 84484 ASSAY OF TROPONIN QUANT: CPT

## 2019-12-17 PROCEDURE — 93005 ELECTROCARDIOGRAM TRACING: CPT

## 2019-12-17 PROCEDURE — 36415 COLL VENOUS BLD VENIPUNCTURE: CPT

## 2019-12-17 PROCEDURE — 87591 N.GONORRHOEAE DNA AMP PROB: CPT

## 2019-12-17 PROCEDURE — 85610 PROTHROMBIN TIME: CPT

## 2019-12-17 PROCEDURE — 83880 ASSAY OF NATRIURETIC PEPTIDE: CPT

## 2019-12-17 PROCEDURE — 93306 TTE W/DOPPLER COMPLETE: CPT

## 2019-12-17 PROCEDURE — 80053 COMPREHEN METABOLIC PANEL: CPT

## 2019-12-17 PROCEDURE — 71046 X-RAY EXAM CHEST 2 VIEWS: CPT

## 2019-12-17 PROCEDURE — 85025 COMPLETE CBC W/AUTO DIFF WBC: CPT

## 2019-12-17 PROCEDURE — 81001 URINALYSIS AUTO W/SCOPE: CPT

## 2019-12-17 PROCEDURE — 84443 ASSAY THYROID STIM HORMONE: CPT

## 2019-12-17 RX ADMIN — CARVEDILOL SCH MG: 12.5 TABLET, FILM COATED ORAL at 22:25

## 2019-12-17 RX ADMIN — SACUBITRIL AND VALSARTAN SCH TAB: 49; 51 TABLET, FILM COATED ORAL at 22:26

## 2019-12-17 RX ADMIN — MORPHINE SULFATE PRN MG: 2 INJECTION, SOLUTION INTRAMUSCULAR; INTRAVENOUS at 22:26

## 2019-12-17 RX ADMIN — MORPHINE SULFATE PRN MG: 2 INJECTION, SOLUTION INTRAMUSCULAR; INTRAVENOUS at 17:40

## 2019-12-17 NOTE — EKG
[facility]

              [address] [place] [address]

Test Date:    2019               Test Time:    15:06:17

Pat Name:     JESSEE TOMPKINS        Department:   

Patient ID:   [id number]           Room:          

Gender:       F                        Technician:   

:          1980               Requested By: ALONDRA ANGELES

Order Number: [id number]           Reading MD:     

                                 Measurements

Intervals                              Axis          

Rate:         72                       P:            46

CO:           118                      QRS:          62

QRSD:         138                      T:            -56

QT:           432                                    

QTc:          480                                    

                           Interpretive Statements

SINUS RHYTHM

NON SPECIFIC INTRAVENTRICULAR BLOCK

QRS(T) CONTOUR ABNORMALITY

CANNOT RULE OUT HIGH LATERAL INFARCT

ABNORMAL ECG

No previous ECG available for comparison

## 2019-12-17 NOTE — PHYS DOC
Past Medical History


Past Medical History:  Asthma, CHF, Hypertension


Past Surgical History:  , Tubal ligation, Other


Additional Past Surgical Histo:  HERNIA REPAIR, PACEMAKER/DEFIB


Alcohol Use:  Occasionally


Drug Use:  None





Adult General


Chief Complaint


Chief Complaint:  Palpitations





HPI


HPI





Patient is a 39  year old female who presents with chest pain, palpitations, 

back pain has been ongoing intermittently for a few weeks. The patient states 

she had several episodes today. She states she had an episode 8:30 AM, 12:00, 

and 2 PM. The patient also states she's been having dysuria for the last couple 

days. She requests STD testing. She reports her pain as 7 out of 10 in severity 

and sharp in her chest. She has a history of congestive heart failure, has a 

pacemaker also has a history of hypertension. The patient has an ejection 

fraction that she reports 15%.





Review of Systems


Review of Systems





Constitutional: Denies fever or chills []


Eyes: Denies change in visual acuity, redness, or eye pain []


HENT: Denies nasal congestion or sore throat []


Respiratory: Denies cough or shortness of breath []


Cardiovascular: No additional information not addressed in HPI []


GI: Denies abdominal pain, nausea, vomiting, bloody stools or diarrhea []


: Reports dysuria.


Musculoskeletal: Denies back pain or joint pain []


Integument: Denies rash or skin lesions []


Neurologic: Denies headache, focal weakness or sensory changes []


Endocrine: Denies polyuria or polydipsia []





Complete systems were reviewed and found to be within normal limits, except as 

documented in this note.





Current Medications


Current Medications





Current Medications








 Medications


  (Trade)  Dose


 Ordered  Sig/Skye  Start Time


 Stop Time Status Last Admin


Dose Admin


 


 Aspirin


  (Children'S


 Aspirin)  324 mg  1X  STAT  19 15:33


 19 15:37 DC  














Allergies


Allergies





Allergies








Coded Allergies Type Severity Reaction Last Updated Verified


 


  aspirin Adverse Reaction Unknown  19 Yes


 


  ibuprofen Adverse Reaction Unknown  19 Yes











Physical Exam


Physical Exam





Constitutional: Well developed, well nourished, no acute distress, non-toxic 

appearance. []


HENT: Normocephalic, atraumatic, bilateral external ears normal, oropharynx 

moist, no oral exudates, nose normal. []


Eyes: PERRLA, EOMI, conjunctiva normal, no discharge. [] 


Neck: Normal range of motion, no tenderness, supple, no stridor. [] 


Cardiovascular:Heart rate regular rhythm, no murmur, defibrillator noted on 

chest.


Lungs & Thorax:  Bilateral breath sounds clear to auscultation []


Abdomen: Bowel sounds normal, soft, no tenderness, no masses, no pulsatile 

masses. [] 


Skin: Warm, dry, no erythema, no rash. [] 


Back: No tenderness, no CVA tenderness. [] 


Extremities: No tenderness, no cyanosis, no clubbing, ROM intact, no edema. [] 


Neurologic: Alert and oriented X 3, normal motor function, normal sensory 

function, no focal deficits noted. []


Psychologic: Affect normal, judgement normal, mood normal. []





Pelvic Exam: External exam is normal and without rash, No CMT, OS is closed, 

clear discharge, uterus NTTP,  No adnexal masses or tenderness noted





Current Patient Data


Vital Signs





                                   Vital Signs








  Date Time  Temp Pulse Resp B/P (MAP) Pulse Ox O2 Delivery O2 Flow Rate FiO2


 


19 14:52 97.8 80 16 150/73 (98) 100 Room Air  





 97.8       








Lab Values





                                Laboratory Tests








Test


 19


14:52 19


15:30


 


Urine Collection Type Unknown   


 


Urine Color Yellow   


 


Urine Clarity Clear   


 


Urine pH 5.5   


 


Urine Specific Gravity >=1.030   


 


Urine Protein


 Negative mg/dL


(NEG-TRACE) 





 


Urine Glucose (UA)


 Negative mg/dL


(NEG) 





 


Urine Ketones (Stick)


 Negative mg/dL


(NEG) 





 


Urine Blood


 Moderate (NEG)


 





 


Urine Nitrite


 Negative (NEG)


 





 


Urine Bilirubin


 Negative (NEG)


 





 


Urine Urobilinogen Dipstick


 1.0 mg/dL (0.2


mg/dL) 





 


Urine Leukocyte Esterase


 Negative (NEG)


 





 


Urine RBC


 Occ /HPF (0-2)


 





 


Urine WBC


 Occ /HPF (0-4)


 





 


Urine Squamous Epithelial


Cells Mod /LPF  


 





 


Urine Bacteria


 Few /HPF


(0-FEW) 





 


Urine Mucus Marked /LPF   


 


White Blood Count


 


 7.9 x10^3/uL


(4.0-11.0)


 


Red Blood Count


 


 4.24 x10^6/uL


(3.50-5.40)


 


Hemoglobin


 


 12.1 g/dL


(12.0-15.5)


 


Hematocrit


 


 37.0 %


(36.0-47.0)


 


Mean Corpuscular Volume


 


 87 fL ()





 


Mean Corpuscular Hemoglobin  28 pg (25-35)  


 


Mean Corpuscular Hemoglobin


Concent 


 33 g/dL


(31-37)


 


Red Cell Distribution Width


 


 13.7 %


(11.5-14.5)


 


Platelet Count


 


 228 x10^3/uL


(140-400)


 


Neutrophils (%) (Auto)  56 % (31-73)  


 


Lymphocytes (%) (Auto)  33 % (24-48)  


 


Monocytes (%) (Auto)  9 % (0-9)  


 


Eosinophils (%) (Auto)  2 % (0-3)  


 


Basophils (%) (Auto)  0 % (0-3)  


 


Neutrophils # (Auto)


 


 4.4 x10^3/uL


(1.8-7.7)


 


Lymphocytes # (Auto)


 


 2.6 x10^3/uL


(1.0-4.8)


 


Monocytes # (Auto)


 


 0.7 x10^3/uL


(0.0-1.1)


 


Eosinophils # (Auto)


 


 0.2 x10^3/uL


(0.0-0.7)


 


Basophils # (Auto)


 


 0.0 x10^3/uL


(0.0-0.2)


 


Prothrombin Time


 


 12.8 SEC


(11.7-14.0)


 


Prothrombin Time INR  1.0 (0.8-1.1)  


 


Activated Partial


Thromboplast Time 


 35 SEC (24-38)





 


Sodium Level


 


 142 mmol/L


(136-145)


 


Potassium Level


 


 3.5 mmol/L


(3.5-5.1)


 


Chloride Level


 


 105 mmol/L


()


 


Carbon Dioxide Level


 


 29 mmol/L


(21-32)


 


Anion Gap  8 (6-14)  


 


Blood Urea Nitrogen


 


 12 mg/dL


(7-20)


 


Creatinine


 


 1.0 mg/dL


(0.6-1.0)


 


Estimated GFR


(Cockcroft-Gault) 


 61.4  





 


BUN/Creatinine Ratio  12 (6-20)  


 


Glucose Level


 


 100 mg/dL


(70-99)  H


 


Calcium Level


 


 8.8 mg/dL


(8.5-10.1)


 


Total Bilirubin


 


 0.3 mg/dL


(0.2-1.0)


 


Aspartate Amino Transferase


(AST) 


 15 U/L (15-37)





 


Alanine Aminotransferase (ALT)


 


 22 U/L (14-59)





 


Alkaline Phosphatase


 


 68 U/L


()


 


Creatine Kinase


 


 128 U/L


()


 


Creatine Kinase MB (Mass)


 


 1.0 ng/mL


(0.0-3.6)


 


Creatine Kinase MB Relative


Index 


 0.8 % (0-4)  





 


Troponin I Quantitative


 


 < 0.017 ng/mL


(0.000-0.055)


 


NT-Pro-B-Type Natriuretic


Peptide 


 474 pg/mL


(0-124)  H


 


Total Protein


 


 7.6 g/dL


(6.4-8.2)


 


Albumin


 


 3.3 g/dL


(3.4-5.0)  L


 


Albumin/Globulin Ratio


 


 0.8 (1.0-1.7)


L





                                Laboratory Tests


19 15:30








                                Laboratory Tests


19 15:30














Microbiology


19 Wet Prep - Final, Complete





EKG


EKG


EKG interpreted by Dr. Tejinder Falcon with a rate of 73, NO STEMI.





Radiology/Procedures


Radiology/Procedures


[]





Course & Med Decision Making


Course & Med Decision Making


Pertinent Labs and Imaging studies reviewed. (See chart for details)





Will get labs, ekg, chest x-ray, and ua. Will also do pelvic exam and perform 

STD testing.





Labs and urine are unremarkable. Patient does have Bacterial vaginosis. Am 

concerned about patient CP with her history. Will admit to hospital for chest 

pain workup. Discussed with Dr. Velazquez who accepts admission ([id number]).





Dragon Disclaimer


Dragon Disclaimer


This electronic medical record was generated, in whole or in part, using a voice

 recognition dictation system.





Departure


Departure


Impression:  


   Primary Impression:  


   Chest pain


   Additional Impressions:  


   Palpitations


   Bacterial vaginosis


Disposition:   ADMITTED AS INPATIENT


Admitting Physician:  HIMS


Condition:  STABLE


Referrals:  


NO PCP (PCP)





The HEART Score for CP Pts


HEART Score for Chest Pain:  








HEART Score for Chest Pain Response (Comments) Value


 


History Moderately Suspicious 1


 


ECG Nonspecific Repolarizatio 1


 


Age < 45 0


 


Risk Factors >3 Risk Factors or Hx CAD 2


 


Troponin < Normal Limit 0


 


Total  4








Risk Factors:


Risk Factors:  DM, Current or recent (<one month) smoker, HTN, HLP, family 

history of CAD, obesity.


Risk Scores:


Score 0 - 3:  2.5% MACE over next 6 weeks - Discharge Home


Score 4 - 6:  20.3% MACE over next 6 weeks - Admit for Clinical Observation


Score 7 - 10:  72.7% MACE over next 6 weeks - Early Invasive Strategies





Problem Qualifiers








   Primary Impression:  


   Chest pain


   Chest pain type:  unspecified  Qualified Codes:  R07.9 - Chest pain, 

   unspecified








ALONDRA ANGELES          Dec 17, 2019 15:43

## 2019-12-17 NOTE — PDOC1
History and Physical


Date of Admission


Date of Admission


DATE: 19 


TIME: 18:35





Identification/Chief Complaint


Chief Complaint


 SEEN IN ER , presented with chest pain, palpitations, back pain has been 

ongoing intermittently for a few weeks. The patient states she had several 

episodes today. 





states she had an episode 8:30 AM, 12:00, and 2 PM. The patient also states 

she's been having dysuria for the last couple days. She requests STD testing. 

She reports her pain as 7 out of 10 in severity and sharp in her chest.








has a history of congestive heart failure, has a pacemaker also has a history of

hypertension. /// ejection fraction that she reports 15%.








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

15%, who was discharged from the hospital 2017





Past Medical History


Past Medical History:  Asthma, CHF, Hypertension


Past Surgical History:  , Tubal ligation, Other


Additional Past Surgical Histo:  HERNIA REPAIR, PACEMAKER/DEFIB


Alcohol Use:  Occasionally


Drug Use:  None








fhx obesity


Cardiovascular:  HTN


Pulmonary:  Asthma


CENTRAL NERVOUS SYSTEM:  Other


GI:  No pertinent hx


Heme/Onc:  No pertinent hx


Hepatobiliary:  No pertinent hx


Psych:  No pertinent hx


Musculoskeletal:  Other


Rheumatologic:  No pertinent hx


Infectious disease:  No pertinent hx


Renal/:  No pertinent hx


Endocrine:  No pertinent hx





Past Surgical History


Past Surgical History:  , Hernia Repair, Tubal Ligation, Other





Family History


Family History:  Coronary Artery Disease, Hypertension, Other (mother had a 

pacemaker)





Social History


Smoke:  No


ALCOHOL:  occassional


Drugs:  None





Current Problem List


Problem List


Problems


Medical Problems:


(1) Bacterial vaginosis


Status: Acute  





(2) Chest pain


Status: Acute  





(3) Palpitations


Status: Acute  











Current Medications


Current Medications





Current Medications


Aspirin (Children'S Aspirin) 324 mg 1X  STAT PO ;  Start 19 at 15:33;  

Stop 19 at 15:37;  Status DC


Metronidazole (Flagyl) 500 mg Q12HR PO  Last administered on 19at 17:40;  

Start 19 at 18:00


Ondansetron HCl (Zofran) 4 mg PRN Q8HRS  PRN IV NAUSEA/VOMITING Last 

administered on 19at 17:40;  Start 19 at 17:30;  Stop 19 at 

17:29


Morphine Sulfate (Morphine Sulfate) 2 mg PRN Q2HR  PRN IV PAIN Last administered

on 19at 17:40;  Start 19 at 17:30;  Stop 19 at 17:29


Nitroglycerin (Nitrostat) 0.4 mg PRN Q5MIN  PRN SL CHEST PAIN Last administered 

on 19at 17:40;  Start 19 at 17:30;  Stop 19 at 17:29





Active Scripts


Active


Toprol Xl (Metoprolol Succinate) 50 Mg Tab.er.24h 1 Tab PO DAILY


Klor-Con M10 (Potassium Chloride) 10 Meq Tab.er.prt 10 Meq PO DAILYWBKFT


Lisinopril 2.5 Mg Tablet 2.5 Mg PO DAILY


Furosemide 40 Mg Tablet 40 Mg PO DAILY


Atorvastatin Calcium 40 Mg Tablet 40 Mg PO QHS


Reported


Proair Hfa Inhaler (Albuterol Sulfate) 8.5 Gm Hfa.aer.ad 1 Puff INH PRN Q6HRS 

PRN


Alavert (Loratadine) 10 Mg Tab.rapdis 10 Mg PO





Allergies


Allergies:  


Coded Allergies:  


     aspirin (Verified  Adverse Reaction, Unknown, 19)


   


   CONTRAINDICATION


     ibuprofen (Verified  Adverse Reaction, Unknown, 19)


   


   CONTRAINDICATION





ROS


Review of System


Review of Systems


Review of Systems





Constitutional: Denies fever or chills []


Eyes: Denies change in visual acuity, redness, or eye pain []


HENT: Denies nasal congestion or sore throat []


Respiratory: Denies cough or shortness of breath []


Cardiovascular: No additional information not addressed in HPI []


GI: Denies abdominal pain, nausea, vomiting, bloody stools or diarrhea []


: Reports dysuria.


Musculoskeletal: Denies back pain or joint pain []


Integument: Denies rash or skin lesions []


Neurologic: Denies headache, focal weakness or sensory changes []


Endocrine: Denies polyuria or polydipsia []





14 pt  systems were reviewed and found to be within normal limits, except as 

documented


Respiratory:  YES: Pleuritic Pain, SOB with excertion


Cardiovascular:  yes Chest Pain


Gastrointestinal:  No Nausea, No Vomiting, No Abdominal Pain, No Diarrhea, No 

Constipation, No Melena, No Hematochezia, No Other


Musculoskeletal:  No Gait Disturbance, No Joint Pain, No Joint Stiffness, No 

Joint Swelling, No Muscle Pain, No Muscular Weakness, No Pain In:, No Swelling 

In:, No Other


Neurological:  No Behavorial Changes, No Bowel/Bladder ControlChng, No 

Confusion, No Dizziness, No Gait Disturbance, No Headaches, No Impaired 

Coord/balance, No Memory Loss, No Numbness/Tingling, No Seizures, No Speech 

Problems, No Tremors, No Visual Changes, No Weakness, No Other





Physical Exam


Physical Exam





Physical Exam


Physical Exam





Constitutional: Well developed, well nourished, no acute distress, non-toxic 

appearance. []


HENT: Normocephalic, atraumatic, bilateral external ears normal, oropharynx 

moist, no oral exudates, nose normal. []


Eyes: PERRLA, EOMI, conjunctiva normal, no discharge. [] 


Neck: Normal range of motion, no tenderness, supple, no stridor. [] 


Cardiovascular:Heart rate regular rhythm, no murmur, defibrillator noted on 

chest.


Lungs & Thorax:  Bilateral breath sounds clear to auscultation []


Abdomen: Bowel sounds normal, soft, no tenderness, no masses, no pulsatile 

masses. [] 


Skin: Warm, dry, no erythema, no rash. [] 


Back: No tenderness, no CVA tenderness. [] 


Extremities: No tenderness, no cyanosis, no clubbing, ROM intact, no edema. [] 


Neurologic: Alert and oriented X 3, normal motor function, normal sensory 

function, no focal deficits noted. []


Psychologic: Affect normal, judgement normal, mood normal. []


General:  Alert, Oriented X3, Cooperative, No acute distress


HEENT:  Atraumatic, EOMI, Mucous membr. moist/pink


Lungs:  Clear to auscultation, Normal air movement


Heart:  no thrills


Breasts:  Not examined


Abdomen:  Normal bowel sounds, Soft, No tenderness


Rectal Exam:  not examined


PELVIC:  Examination not indicated


Extremities:  No clubbing, No cyanosis


Neuro:  Normal speech, Cranial nerves 3-12 NL


Psych/Mental Status:  Mental status NL, Mood NL





Vitals


Vitals





Vital Signs








  Date Time  Temp Pulse Resp B/P (MAP) Pulse Ox O2 Delivery O2 Flow Rate FiO2


 


19 17:40  76  140/78    


 


19 17:40   16  100 Room Air  


 


12/17/19 14:52 97.8       





 97.8       











Labs


Labs





Laboratory Tests








Test


 19


14:52 19


15:30


 


Urine Collection Type Unknown  


 


Urine Color Yellow  


 


Urine Clarity Clear  


 


Urine pH 5.5  


 


Urine Specific Gravity >=1.030  


 


Urine Protein


 Negative mg/dL


(NEG-TRACE) 





 


Urine Glucose (UA)


 Negative mg/dL


(NEG) 





 


Urine Ketones (Stick)


 Negative mg/dL


(NEG) 





 


Urine Blood Moderate (NEG)  


 


Urine Nitrite Negative (NEG)  


 


Urine Bilirubin Negative (NEG)  


 


Urine Urobilinogen Dipstick


 1.0 mg/dL (0.2


mg/dL) 





 


Urine Leukocyte Esterase Negative (NEG)  


 


Urine RBC Occ /HPF (0-2)  


 


Urine WBC Occ /HPF (0-4)  


 


Urine Squamous Epithelial


Cells Mod /LPF 


 





 


Urine Bacteria


 Few /HPF


(0-FEW) 





 


Urine Mucus Marked /LPF  


 


White Blood Count


 


 7.9 x10^3/uL


(4.0-11.0)


 


Red Blood Count


 


 4.24 x10^6/uL


(3.50-5.40)


 


Hemoglobin


 


 12.1 g/dL


(12.0-15.5)


 


Hematocrit


 


 37.0 %


(36.0-47.0)


 


Mean Corpuscular Volume  87 fL () 


 


Mean Corpuscular Hemoglobin  28 pg (25-35) 


 


Mean Corpuscular Hemoglobin


Concent 


 33 g/dL


(31-37)


 


Red Cell Distribution Width


 


 13.7 %


(11.5-14.5)


 


Platelet Count


 


 228 x10^3/uL


(140-400)


 


Neutrophils (%) (Auto)  56 % (31-73) 


 


Lymphocytes (%) (Auto)  33 % (24-48) 


 


Monocytes (%) (Auto)  9 % (0-9) 


 


Eosinophils (%) (Auto)  2 % (0-3) 


 


Basophils (%) (Auto)  0 % (0-3) 


 


Neutrophils # (Auto)


 


 4.4 x10^3/uL


(1.8-7.7)


 


Lymphocytes # (Auto)


 


 2.6 x10^3/uL


(1.0-4.8)


 


Monocytes # (Auto)


 


 0.7 x10^3/uL


(0.0-1.1)


 


Eosinophils # (Auto)


 


 0.2 x10^3/uL


(0.0-0.7)


 


Basophils # (Auto)


 


 0.0 x10^3/uL


(0.0-0.2)


 


Prothrombin Time


 


 12.8 SEC


(11.7-14.0)


 


Prothromb Time International


Ratio 


 1.0 (0.8-1.1) 





 


Activated Partial


Thromboplast Time 


 35 SEC (24-38) 





 


Sodium Level


 


 142 mmol/L


(136-145)


 


Potassium Level


 


 3.5 mmol/L


(3.5-5.1)


 


Chloride Level


 


 105 mmol/L


()


 


Carbon Dioxide Level


 


 29 mmol/L


(21-32)


 


Anion Gap  8 (6-14) 


 


Blood Urea Nitrogen


 


 12 mg/dL


(7-20)


 


Creatinine


 


 1.0 mg/dL


(0.6-1.0)


 


Estimated GFR


(Cockcroft-Gault) 


 61.4 





 


BUN/Creatinine Ratio  12 (6-20) 


 


Glucose Level


 


 100 mg/dL


(70-99)


 


Calcium Level


 


 8.8 mg/dL


(8.5-10.1)


 


Total Bilirubin


 


 0.3 mg/dL


(0.2-1.0)


 


Aspartate Amino Transf


(AST/SGOT) 


 15 U/L (15-37) 





 


Alanine Aminotransferase


(ALT/SGPT) 


 22 U/L (14-59) 





 


Alkaline Phosphatase


 


 68 U/L


()


 


Creatine Kinase


 


 128 U/L


()


 


Creatine Kinase MB (Mass)


 


 1.0 ng/mL


(0.0-3.6)


 


Creatine Kinase MB Relative


Index 


 0.8 % (0-4) 





 


Troponin I Quantitative


 


 < 0.017 ng/mL


(0.000-0.055)


 


NT-Pro-B-Type Natriuretic


Peptide 


 474 pg/mL


(0-124)


 


Total Protein


 


 7.6 g/dL


(6.4-8.2)


 


Albumin


 


 3.3 g/dL


(3.4-5.0)


 


Albumin/Globulin Ratio  0.8 (1.0-1.7) 








Laboratory Tests








Test


 19


14:52 19


15:30


 


Urine Collection Type Unknown  


 


Urine Color Yellow  


 


Urine Clarity Clear  


 


Urine pH 5.5  


 


Urine Specific Gravity >=1.030  


 


Urine Protein


 Negative mg/dL


(NEG-TRACE) 





 


Urine Glucose (UA)


 Negative mg/dL


(NEG) 





 


Urine Ketones (Stick)


 Negative mg/dL


(NEG) 





 


Urine Blood Moderate (NEG)  


 


Urine Nitrite Negative (NEG)  


 


Urine Bilirubin Negative (NEG)  


 


Urine Urobilinogen Dipstick


 1.0 mg/dL (0.2


mg/dL) 





 


Urine Leukocyte Esterase Negative (NEG)  


 


Urine RBC Occ /HPF (0-2)  


 


Urine WBC Occ /HPF (0-4)  


 


Urine Squamous Epithelial


Cells Mod /LPF 


 





 


Urine Bacteria


 Few /HPF


(0-FEW) 





 


Urine Mucus Marked /LPF  


 


White Blood Count


 


 7.9 x10^3/uL


(4.0-11.0)


 


Red Blood Count


 


 4.24 x10^6/uL


(3.50-5.40)


 


Hemoglobin


 


 12.1 g/dL


(12.0-15.5)


 


Hematocrit


 


 37.0 %


(36.0-47.0)


 


Mean Corpuscular Volume  87 fL () 


 


Mean Corpuscular Hemoglobin  28 pg (25-35) 


 


Mean Corpuscular Hemoglobin


Concent 


 33 g/dL


(31-37)


 


Red Cell Distribution Width


 


 13.7 %


(11.5-14.5)


 


Platelet Count


 


 228 x10^3/uL


(140-400)


 


Neutrophils (%) (Auto)  56 % (31-73) 


 


Lymphocytes (%) (Auto)  33 % (24-48) 


 


Monocytes (%) (Auto)  9 % (0-9) 


 


Eosinophils (%) (Auto)  2 % (0-3) 


 


Basophils (%) (Auto)  0 % (0-3) 


 


Neutrophils # (Auto)


 


 4.4 x10^3/uL


(1.8-7.7)


 


Lymphocytes # (Auto)


 


 2.6 x10^3/uL


(1.0-4.8)


 


Monocytes # (Auto)


 


 0.7 x10^3/uL


(0.0-1.1)


 


Eosinophils # (Auto)


 


 0.2 x10^3/uL


(0.0-0.7)


 


Basophils # (Auto)


 


 0.0 x10^3/uL


(0.0-0.2)


 


Prothrombin Time


 


 12.8 SEC


(11.7-14.0)


 


Prothromb Time International


Ratio 


 1.0 (0.8-1.1) 





 


Activated Partial


Thromboplast Time 


 35 SEC (24-38) 





 


Sodium Level


 


 142 mmol/L


(136-145)


 


Potassium Level


 


 3.5 mmol/L


(3.5-5.1)


 


Chloride Level


 


 105 mmol/L


()


 


Carbon Dioxide Level


 


 29 mmol/L


(21-32)


 


Anion Gap  8 (6-14) 


 


Blood Urea Nitrogen


 


 12 mg/dL


(7-20)


 


Creatinine


 


 1.0 mg/dL


(0.6-1.0)


 


Estimated GFR


(Cockcroft-Gault) 


 61.4 





 


BUN/Creatinine Ratio  12 (6-20) 


 


Glucose Level


 


 100 mg/dL


(70-99)


 


Calcium Level


 


 8.8 mg/dL


(8.5-10.1)


 


Total Bilirubin


 


 0.3 mg/dL


(0.2-1.0)


 


Aspartate Amino Transf


(AST/SGOT) 


 15 U/L (15-37) 





 


Alanine Aminotransferase


(ALT/SGPT) 


 22 U/L (14-59) 





 


Alkaline Phosphatase


 


 68 U/L


()


 


Creatine Kinase


 


 128 U/L


()


 


Creatine Kinase MB (Mass)


 


 1.0 ng/mL


(0.0-3.6)


 


Creatine Kinase MB Relative


Index 


 0.8 % (0-4) 





 


Troponin I Quantitative


 


 < 0.017 ng/mL


(0.000-0.055)


 


NT-Pro-B-Type Natriuretic


Peptide 


 474 pg/mL


(0-124)


 


Total Protein


 


 7.6 g/dL


(6.4-8.2)


 


Albumin


 


 3.3 g/dL


(3.4-5.0)


 


Albumin/Globulin Ratio  0.8 (1.0-1.7) 











Images


Images





TDI


Lateral E' P. V   6.69cm/s   Medial E' P. V   6.24cm/s


E/Lateral E'   18.4      E/Medial E'   19.7





Tricuspid Valve


TR P. Velocity   260cm/s   RAP ESTIMATE   3mmHg


TR Peak Gr.   27mmHg   RVSP      30mmHg





Pulmonary Vein


S1 Velocity   56.2cm/s   S2 Velocity   47.57cm/s


D2 Velocity   47.6cm/s         





 LEFT VENTRICLE 


The Left Ventricle is mildly dilated. There is normal left ventricular wall 

thickness. Left ventricle systolic function is severely impaired. The Ejection 

Fraction is 10-15%. There is severe global hypokinesis of the left ventricle. 

Tissue Doppler imaging reveals moderate left ventricular diastolic dysfunction.





 RIGHT VENTRICLE 


The right ventricle is normal size. The right ventricular systolic function is 

normal.





 ATRIA 


The left atrium is mildly dilated. The right atrium size is normal. The 

interatrial septum is intact with no evidence for an atrial septal defect or 

patent foramen ovale as noted on 2-D or Doppler imaging.





 AORTIC VALVE 


The aortic valve is normal in structure and function. Doppler and Color Flow 

revealed no significant aortic regurgitation. There is no significant aortic 

valvular stenosis.





 MITRAL VALVE 


The mitral valve is normal in structure and function. There is no evidence of 

mitral valve prolapse. There is no mitral valve stenosis. Doppler and Color-flow

 revealed trace mitral regurgitation.





 TRICUSPID VALVE 


The tricuspid valve is normal in structure and function. Doppler and Color Flow 

revealed trace tricuspid regurgitation. The PA pressure was estimated at 30 

mmHg. There is no tricuspid valve stenosis.





 PULMONIC VALVE 


The pulmonary valve is normal in structure and function. Doppler and Color Flow 

revealed trace pulmonic valvular regurgitation. There is no pulmonic valvular 

stenosis.





 GREAT VESSELS 


The aortic root is normal in size. The ascending aorta is normal in size. The 

IVC is normal in size and collapses >50% with inspiration.





 PERICARDIAL EFFUSION 


There is a trace circumferential pericardial effusion.





Critical Notification


Physician Notified 


Date: 2017


Time: 09:25


Physician Name:Jg KATELYNN Zhang


Critical Value: Yes





<Conclusion>


Left ventricle systolic function is severely impaired. The Ejection Fraction is 

10-15%.


There is severe global hypokinesis of the left ventricle.














DICTATED and SIGNED BY:     SHERLEY ADAMS MD


DATE:     17 0958





CC: SHERLEY ADAMS MD; DONALD CORONEL MD; BILL WING MD; UNKNOWN PCP 

NAME ~





VTE Prophylaxis Ordered


VTE Prophylaxis Devices:  Yes


VTE Pharmacological Prophylaxi:  Yes





Assessment/Plan


Assessment/Plan


IMPRESSION





Chest discomfort, pain


morbid obesity


 2017  echo///  Left ventricle systolic function was  severely impaired. 

Ejection Fraction was  10-15% severe global hypokinesis of the left ventricle 

diagnosis of preeclampsia and post-partum cardiomyopathy with


ejection fraction 10%-15%, who was discharged from the hospital  











plan





admit


tele bed


serial troponin i


tele


consult cardiology


echo


dvt prophylaxis








74 min pt exam, chart review, > 50% of time spent with exam, chart review, pt 

care coordination











GRAY CORNELIUS MD          Dec 17, 2019 18:35

## 2019-12-17 NOTE — RAD
EXAM: Chest, 2 views.

 

HISTORY: Chest pain. Palpitations.

 

COMPARISON: 5/22/2017.

 

FINDINGS: 2 views of chest are obtained. There is no infiltrate, 

protrusion or pneumothorax. The heart is normal in size. There is a 

cardiac pacemaker defibrillator with leads overlying expected position.

 

IMPRESSION: No acute pulmonary finding.

 

Electronically signed by: Angela Carranza MD (12/17/2019 4:54 PM) [id number]

## 2019-12-18 VITALS — DIASTOLIC BLOOD PRESSURE: 77 MMHG | SYSTOLIC BLOOD PRESSURE: 116 MMHG

## 2019-12-18 VITALS — DIASTOLIC BLOOD PRESSURE: 64 MMHG | SYSTOLIC BLOOD PRESSURE: 158 MMHG

## 2019-12-18 VITALS — SYSTOLIC BLOOD PRESSURE: 125 MMHG | DIASTOLIC BLOOD PRESSURE: 77 MMHG

## 2019-12-18 RX ADMIN — CARVEDILOL SCH MG: 12.5 TABLET, FILM COATED ORAL at 10:33

## 2019-12-18 RX ADMIN — CARVEDILOL SCH MG: 12.5 TABLET, FILM COATED ORAL at 17:24

## 2019-12-18 RX ADMIN — SACUBITRIL AND VALSARTAN SCH TAB: 49; 51 TABLET, FILM COATED ORAL at 10:31

## 2019-12-18 NOTE — PDOC2
CARDIAC CONSULT


DATE OF CONSULT


Date of Consult


DATE: 19 


TIME: 09:17





REASON FOR CONSULT


Reason for Consult:


Chest pain, palpitations





REFERRING PHYSICIAN


Referring Physician:


Arnel PACE





SOURCE


Source:  Chart review, Patient





HISTORY OF PRESENT ILLNESS


HISTORY OF PRESENT ILLNESS


This is a 40 yo female who presented secondary the palpitations. Patient reports

she has been experiencing intermittent palpitations for the last months. Seems 

to be worse recently. No chest pain, SOA, dizziness, diaphoresis, or 

nausea/vomiting. Has a history of NICM s/p AICD. Follows with Dr. Law of  

cardiology. Reports compliance with medications.





PAST MEDICAL HISTORY


Past Medical History


Cardiovascular:  HTN (preeclampsia), post-partum cardiomyopathy 


Pulmonary:  Asthma


CENTRAL NERVOUS SYSTEM:  Other (No pertinent history)


GI:  No pertinent hx


Heme/Onc:  No pertinent hx


Hepatobiliary:  No pertinent hx


Psych:  No pertinent hx


Musculoskeletal:  Other (None)


Rheumatologic:  No pertinent hx


Infectious disease:  No pertinent hx


ENT:  Allergic Rhinitis


Renal/:  No pertinent hx


Endocrine:  No pertinent hx


Dermatology:  No pertinent hx





PAST SURGICAL HISTORY


Past Surgical History


, Hernia Repair, Tubal Ligation





FAMILY HISTORY


Family History:  Coronary Artery Disease





SOCIAL HISTORY


Social History


Smoke:  No


ALCOHOL:  occasional


Drugs:  None


Lives:  with Family





CURRENT MEDICATIONS


CURRENT MEDICATIONS





Current Medications








 Medications


  (Trade)  Dose


 Ordered  Sig/Skye


 Route


 PRN Reason  Start Time


 Stop Time Status Last Admin


Dose Admin


 


 Metronidazole


  (Flagyl)  500 mg  Q12HR


 PO


   19 18:00


    19 17:40





 


 Ondansetron HCl


  (Zofran)  4 mg  PRN Q8HRS  PRN


 IV


 NAUSEA/VOMITING  19 17:30


 19 19:58 DC 19 17:40





 


 Morphine Sulfate


  (Morphine


 Sulfate)  2 mg  PRN Q2HR  PRN


 IV


 PAIN  19 17:30


 19 17:29  19 22:26





 


 Nitroglycerin


  (Nitrostat)  0.4 mg  PRN Q5MIN  PRN


 SL


 CHEST PAIN  19 17:30


 19 17:29  19 17:40





 


 Acetaminophen


  (Tylenol)  650 mg  PRN Q4HRS  PRN


 PO


 TEMP OVER 100.4F OR MILD PAIN  19 20:00


    19 01:17





 


 Carvedilol


  (Coreg)  25 mg  BIDWMEALS


 PO


   19 22:15


    19 22:25





 


 Sacubitril/


 Valsartan


  (Entresto 49


 Mg-51 Mg)  2 tab  BID


 PO


   19 22:00


    19 22:26














ALLERGIES


ALLERGIES:  


Coded Allergies:  


     aspirin (Verified  Adverse Reaction, Unknown, 19)


   


   CONTRAINDICATION


     ibuprofen (Verified  Adverse Reaction, Unknown, 19)


   


   CONTRAINDICATION





ROS


Review of System


14 point ROS conducted with pertinent positives noted above in hPI





PHYSICAL EXAM


PHYSICAL EXAM


General:  Alert, Oriented X3, Cooperative, No acute distress


HEENT:  Atraumatic, Mucous membr. moist/pink


Lungs:  Clear to auscultation, Normal air movement


Heart:  Regular rate (tele: SR LBBB), Normal S1, Normal S2,


Abdomen:  Soft, No tenderness


Extremities:  No cyanosis, No edema 


Skin:  No breakdown, No significant lesion


Neuro:  Normal speech, Sensation intact


Psych/Mental Status:  Mental status NL, Mood NL


MUSCULOSKELETAL:  Full range of motion without pain





VITALS/I&O


VITALS/I&O:





                                   Vital Signs








  Date Time  Temp Pulse Resp B/P (MAP) Pulse Ox O2 Delivery O2 Flow Rate FiO2


 


19 07:00 98.3 77 18 158/64 (95) 100 Room Air  





 98.3       














                                    I & O   


 


 19





 15:00 23:00 07:00


 


Intake Total  250 ml 100 ml


 


Balance  250 ml 100 ml











LABS


Lab:





                                Laboratory Tests








Test


 19


14:52 19


15:30 19


00:40 19


03:58


 


Urine Collection Type Unknown     


 


Urine Color Yellow     


 


Urine Clarity Clear     


 


Urine pH 5.5     


 


Urine Specific Gravity >=1.030     


 


Urine Protein


 Negative mg/dL


(NEG-TRACE) 


 


 





 


Urine Glucose (UA)


 Negative mg/dL


(NEG) 


 


 





 


Urine Ketones (Stick)


 Negative mg/dL


(NEG) 


 


 





 


Urine Blood


 Moderate (NEG)


 


 


 





 


Urine Nitrite


 Negative (NEG)


 


 


 





 


Urine Bilirubin


 Negative (NEG)


 


 


 





 


Urine Urobilinogen Dipstick


 1.0 mg/dL (0.2


mg/dL) 


 


 





 


Urine Leukocyte Esterase


 Negative (NEG)


 


 


 





 


Urine RBC


 Occ /HPF (0-2)


 


 


 





 


Urine WBC


 Occ /HPF (0-4)


 


 


 





 


Urine Squamous Epithelial


Cells Mod /LPF  


 


 


 





 


Urine Bacteria


 Few /HPF


(0-FEW) 


 


 





 


Urine Mucus Marked /LPF     


 


White Blood Count


 


 7.9 x10^3/uL


(4.0-11.0) 


 





 


Red Blood Count


 


 4.24 x10^6/uL


(3.50-5.40) 


 





 


Hemoglobin


 


 12.1 g/dL


(12.0-15.5) 


 





 


Hematocrit


 


 37.0 %


(36.0-47.0) 


 





 


Mean Corpuscular Volume


 


 87 fL ()


 


 





 


Mean Corpuscular Hemoglobin  28 pg (25-35)    


 


Mean Corpuscular Hemoglobin


Concent 


 33 g/dL


(31-37) 


 





 


Red Cell Distribution Width


 


 13.7 %


(11.5-14.5) 


 





 


Platelet Count


 


 228 x10^3/uL


(140-400) 


 





 


Neutrophils (%) (Auto)  56 % (31-73)    


 


Lymphocytes (%) (Auto)  33 % (24-48)    


 


Monocytes (%) (Auto)  9 % (0-9)    


 


Eosinophils (%) (Auto)  2 % (0-3)    


 


Basophils (%) (Auto)  0 % (0-3)    


 


Neutrophils # (Auto)


 


 4.4 x10^3/uL


(1.8-7.7) 


 





 


Lymphocytes # (Auto)


 


 2.6 x10^3/uL


(1.0-4.8) 


 





 


Monocytes # (Auto)


 


 0.7 x10^3/uL


(0.0-1.1) 


 





 


Eosinophils # (Auto)


 


 0.2 x10^3/uL


(0.0-0.7) 


 





 


Basophils # (Auto)


 


 0.0 x10^3/uL


(0.0-0.2) 


 





 


Prothrombin Time


 


 12.8 SEC


(11.7-14.0) 


 





 


Prothrombin Time INR  1.0 (0.8-1.1)    


 


Activated Partial


Thromboplast Time 


 35 SEC (24-38)


 


 





 


Sodium Level


 


 142 mmol/L


(136-145) 


 





 


Potassium Level


 


 3.5 mmol/L


(3.5-5.1) 


 





 


Chloride Level


 


 105 mmol/L


() 


 





 


Carbon Dioxide Level


 


 29 mmol/L


(21-32) 


 





 


Anion Gap  8 (6-14)    


 


Blood Urea Nitrogen


 


 12 mg/dL


(7-20) 


 





 


Creatinine


 


 1.0 mg/dL


(0.6-1.0) 


 





 


Estimated GFR


(Cockcroft-Gault) 


 61.4  


 


 





 


BUN/Creatinine Ratio  12 (6-20)    


 


Glucose Level


 


 100 mg/dL


(70-99)  H 


 





 


Calcium Level


 


 8.8 mg/dL


(8.5-10.1) 


 





 


Total Bilirubin


 


 0.3 mg/dL


(0.2-1.0) 


 





 


Aspartate Amino Transferase


(AST) 


 15 U/L (15-37)


 


 





 


Alanine Aminotransferase (ALT)


 


 22 U/L (14-59)


 


 





 


Alkaline Phosphatase


 


 68 U/L


() 


 





 


Creatine Kinase


 


 128 U/L


() 


 





 


Creatine Kinase MB (Mass)


 


 1.0 ng/mL


(0.0-3.6) 


 





 


Creatine Kinase MB Relative


Index 


 0.8 % (0-4)  


 


 





 


Troponin I Quantitative


 


 < 0.017 ng/mL


(0.000-0.055) < 0.017 ng/mL


(0.000-0.055) < 0.017 ng/mL


(0.000-0.055)


 


NT-Pro-B-Type Natriuretic


Peptide 


 474 pg/mL


(0-124)  H 


 





 


Total Protein


 


 7.6 g/dL


(6.4-8.2) 


 





 


Albumin


 


 3.3 g/dL


(3.4-5.0)  L 


 





 


Albumin/Globulin Ratio


 


 0.8 (1.0-1.7)


L 


 








                                Laboratory Tests


19 15:30








                                Laboratory Tests


19 15:30











ECHOCARDIOGRAM


ECHOCARDIOGRAM


<Conclusion>


Left ventricle systolic function is severely impaired. The Ejection Fraction is 

10-15%.


There is severe global hypokinesis of the left ventricle.





DATE:     17 0958





Summary 


Left Ventricle: Normal size and shape. Concentric remodeling. Moderately 

decreased ejection fraction with LVEF=40% and diffuse hypokinesis.


Right Ventricle: Normal size, wall thickness and ejection fraction.


Normal biatrial size.


Valves are normal in structure and function.


Estimated Peak Systolic PA Pressure 26 mmHg


Compared with study dated 18, there has been a mild improvement in global 

LV function.


 


19 -  2-D + DOPPLER ECHOCARDIOGRAM





HEART CATH


HEART CATH


FINDINGS


1.  Hemodynamics: Left ventricular end-diastolic pressure of 27 mmHg.  No 

pullback gradient across the aortic valve.


2.  Left ventriculography: Severe global left ventricular systolic dysfunction 

with ejection fraction estimated at 15-20%.  No significant mitral regurgitation

seen.


3.  Coronary angiography:


a.  The left main coronary artery arose from the left sinus of Valsalva, gave 

rise to the left anterior descending and left circumflex arteries and did not 

show any significant stenosis.


b.  The left anterior descending artery did not show any significant stenosis.


c.  The left circumflex artery did not show any significant stenosis.


d.  The right coronary artery was a large and dominant vessel arising from the 

right sinus of Valsalva that did not show any significant stenosis.





Conclusion


1.  No significant coronary artery disease


2.  Severe global left ventricular systolic dysfunction with ejection fraction 

estimated at 15-20%





DATE:     17 1305





ASSESSMENT/PLAN


ASSESSMENT/PLAN


1.  Chest pain, atypical. AMI ruled out. Cath  with no significant coronary 

artery disease


2.  Palpitations; no tachyarrhythmias thus far on tele although no report of 

palpitations overnight 


3.  Chronic systolic CHF; clinically compensated 


4.  Cardiomyopathy, non-ischemic: LVEF 10-15% (2017). Most recent echo 2019 

with LVEF 40% as noted above. [place] Dr. Law of [facility] [place]/p Bi-V AICD ([facility])


5.  Morbid obesity


6.  HTN ;controlled 








Recommendations





TSH, Mg level


Device interrogation


Echo completed this morning


Resume HF optimization wtih BB, Entresto, spironolactone, and lasix


If patient having elevated HR, will convert coreg to Toprol for rate control


Discussed limiting caffeine intake.











SARAHI MCPHERSON           Dec 18, 2019 09:27

## 2019-12-18 NOTE — CARD
MR#: [id number]

Account#: [id number]

Accession#: [id number]

Date of Study: 12/18/2019

Ordering Physician: GRAY CORNELIUS, 

Referring Physician: GRAY CORNELIUS, 

Tech: Izabela Zambrano RDCS





--------------- APPROVED REPORT --------------





EXAM: Two-dimensional and M-mode echocardiogram with Doppler and color Doppler.



Other Information 

Quality : Good



INDICATION

Congestive Heart Failure 



Surgery/Intervention

ICD/Pacemaker: Date: 01/2019



2D DIMENSIONS 

RVDd3.1 (2.9-3.5cm)Left Atrium(2D)4.1 (1.6-4.0cm)

IVSd1.1 (0.7-1.1cm)Aortic Root(2D)2.7 (2.0-3.7cm)

LVDd4.9 (3.9-5.9cm)LVOT Diameter2.0 (1.8-2.4cm)

PWd1.1 (0.7-1.1cm)LVDs4.4 (2.5-4.0cm)

FS (%) 11.6 %SV28.7 ml

LVEF(%)25.0 (>50%)



M-Mode DIMENSIONS 

IVSd0.95 (0.7-1.1cm)LVDd5.02 (4.0-5.6cm)

PWd1.28 (0.7-1.1cm)IVSs5.16 cm



Aortic Valve

AoV Peak Vipin.141.6cm/sAoV VTI28.7cm

AO Peak GR.8.0mmHgLVOT  VTI 20.82cm

AO Mean GR.5mmHgAVA   (VTI)2.40cm2



Mitral Valve

MV E Iqqqynep49.0cm/sMV DECEL QFAB400kr

MV A Hyiilquk94.7cm/sE/A  Ratio1.3



TDI

Lateral E' P. V7.85cm/sMedial E' P. V6.11cm/s

E/Lateral E'10.8E/Medial E'13.9



Tricuspid Valve

TR P. Gfprozxs691by/sRAP QZXXQFVD2bjDw

TR Peak Gr.86jyGqYOWT45xnSf



Pulmonary Vein

S1 Cvykafov17.5cm/sS2 Hrtzgzcc90.12cm/s

D2 Dxmwvaok27.1cm/s



 LEFT VENTRICLE 

The Left Ventricle is moderately dilated. There is mild concentric left ventricular hypertrophy. Left
 ventricle systolic function is moderately impaired. The Ejection Fraction is 25-30%. There is modera
te global hypokinesis of the left ventricle. The left ventricular diastolic function and filling is n
ormal for age.



 RIGHT VENTRICLE 

The right ventricle is normal size. The right ventricular systolic function is normal. There is a pac
emaker lead in the right ventricle.



 ATRIA 

The left atrium is mildly dilated. The right atrium size is normal. A pacemaker is seen in the right 
atrium consistent with history. The interatrial septum is intact with no evidence for an atrial septa
l defect or patent foramen ovale as noted on 2-D or Doppler imaging.



 AORTIC VALVE 

The aortic valve is normal in structure and function. Doppler and Color Flow revealed no significant 
aortic regurgitation. There is no significant aortic valvular stenosis.



 MITRAL VALVE 

The mitral valve is normal in structure and function. There is no evidence of mitral valve prolapse. 
There is no mitral valve stenosis. Doppler and Color-flow revealed trace mitral regurgitation.



 TRICUSPID VALVE 

The tricuspid valve is normal in structure and function. Doppler and Color Flow revealed trace tricus
pid regurgitation. The PA pressure was estimated at 29 mmHg. There is no tricuspid valve stenosis.



 PULMONIC VALVE 

The pulmonic valve is not well visualized. Doppler and Color Flow revealed no pulmonic valvular regur
gitation. There is no pulmonic valvular stenosis.



 GREAT VESSELS 

The aortic root is normal in size. The ascending aorta is normal in size. The IVC is normal in size a
nd collapses >50% with inspiration.



 PERICARDIAL EFFUSION 

There is no evidence of significant pericardial effusion.



Critical Notification

Critical Value: No



<Conclusion>

Left ventricle systolic function is moderately impaired. The Ejection Fraction is 25-30%.

There is moderate global hypokinesis of the left ventricle.

There is a pacemaker lead in the right ventricle.



Signed by : Capo Davis, 

Electronically Approved : 12/18/2019 11:22:42

## 2019-12-18 NOTE — NUR
Discharge Note:



JESSEE TOMPKINS [facility]



Discharge instructions and discharge home medications reviewed with Patient and a copy 
given. All questions have been answered and understanding verbalized. 



The following instructions and handouts were given: medications, activity, diet and symptoms 
worsening.



Discontinued lines and drains: Discontinued 20g antecubital peripheral IV with no 
complications, direct pressure and bandage applied and tip was intact.



Patient discharged to home with self via ambulation and escorted to emergency entrance with 
CNA.

## 2019-12-18 NOTE — PDOC3
Discharge Summary


Visit Information


Date of Admission:  Dec 17, 2019


Date of Discharge:  Dec 18, 2019


Admitting Diagnosis Comment:


CM, EF 40% (from 10-15%)


Indwelling ST hilario AICD


Chest discomfort, pain


morbid obesity


Final Diagnosis


Problems


Medical Problems:


(1) Bacterial vaginosis


Status: Acute  





(2) Chest pain


Status: Acute  





(3) Palpitations


Status: Acute  











Brief Hospital Course


Allergies





                                    Allergies








Coded Allergies Type Severity Reaction Last Updated Verified


 


  aspirin Adverse Reaction Unknown  12/17/19 Yes


 


  ibuprofen Adverse Reaction Unknown  12/17/19 Yes








Vital Signs





Vital Signs








  Date Time  Temp Pulse Resp B/P (MAP) Pulse Ox O2 Delivery O2 Flow Rate FiO2


 


12/18/19 11:12 98.4 116 18 125/77 (93) 98 Room Air  





 98.4       








Lab Results





Laboratory Tests








Test


 12/17/19


14:52 12/17/19


15:30 12/18/19


00:40 12/18/19


03:45


 


Urine Collection Type Unknown    


 


Urine Color Yellow    


 


Urine Clarity Clear    


 


Urine pH 5.5    


 


Urine Specific Gravity >=1.030    


 


Urine Protein


 Negative mg/dL


(NEG-TRACE) 


 


 





 


Urine Glucose (UA)


 Negative mg/dL


(NEG) 


 


 





 


Urine Ketones (Stick)


 Negative mg/dL


(NEG) 


 


 





 


Urine Blood Moderate (NEG)    


 


Urine Nitrite Negative (NEG)    


 


Urine Bilirubin Negative (NEG)    


 


Urine Urobilinogen Dipstick


 1.0 mg/dL (0.2


mg/dL) 


 


 





 


Urine Leukocyte Esterase Negative (NEG)    


 


Urine RBC Occ /HPF (0-2)    


 


Urine WBC Occ /HPF (0-4)    


 


Urine Squamous Epithelial


Cells Mod /LPF 


 


 


 





 


Urine Bacteria


 Few /HPF


(0-FEW) 


 


 





 


Urine Mucus Marked /LPF    


 


White Blood Count


 


 7.9 x10^3/uL


(4.0-11.0) 


 





 


Red Blood Count


 


 4.24 x10^6/uL


(3.50-5.40) 


 





 


Hemoglobin


 


 12.1 g/dL


(12.0-15.5) 


 





 


Hematocrit


 


 37.0 %


(36.0-47.0) 


 





 


Mean Corpuscular Volume  87 fL ()   


 


Mean Corpuscular Hemoglobin  28 pg (25-35)   


 


Mean Corpuscular Hemoglobin


Concent 


 33 g/dL


(31-37) 


 





 


Red Cell Distribution Width


 


 13.7 %


(11.5-14.5) 


 





 


Platelet Count


 


 228 x10^3/uL


(140-400) 


 





 


Neutrophils (%) (Auto)  56 % (31-73)   


 


Lymphocytes (%) (Auto)  33 % (24-48)   


 


Monocytes (%) (Auto)  9 % (0-9)   


 


Eosinophils (%) (Auto)  2 % (0-3)   


 


Basophils (%) (Auto)  0 % (0-3)   


 


Neutrophils # (Auto)


 


 4.4 x10^3/uL


(1.8-7.7) 


 





 


Lymphocytes # (Auto)


 


 2.6 x10^3/uL


(1.0-4.8) 


 





 


Monocytes # (Auto)


 


 0.7 x10^3/uL


(0.0-1.1) 


 





 


Eosinophils # (Auto)


 


 0.2 x10^3/uL


(0.0-0.7) 


 





 


Basophils # (Auto)


 


 0.0 x10^3/uL


(0.0-0.2) 


 





 


Prothrombin Time


 


 12.8 SEC


(11.7-14.0) 


 





 


Prothromb Time International


Ratio 


 1.0 (0.8-1.1) 


 


 





 


Activated Partial


Thromboplast Time 


 35 SEC (24-38) 


 


 





 


Sodium Level


 


 142 mmol/L


(136-145) 


 





 


Potassium Level


 


 3.5 mmol/L


(3.5-5.1) 


 





 


Chloride Level


 


 105 mmol/L


() 


 





 


Carbon Dioxide Level


 


 29 mmol/L


(21-32) 


 





 


Anion Gap  8 (6-14)   


 


Blood Urea Nitrogen


 


 12 mg/dL


(7-20) 


 





 


Creatinine


 


 1.0 mg/dL


(0.6-1.0) 


 





 


Estimated GFR


(Cockcroft-Gault) 


 61.4 


 


 





 


BUN/Creatinine Ratio  12 (6-20)   


 


Glucose Level


 


 100 mg/dL


(70-99) 


 





 


Calcium Level


 


 8.8 mg/dL


(8.5-10.1) 


 





 


Total Bilirubin


 


 0.3 mg/dL


(0.2-1.0) 


 





 


Aspartate Amino Transf


(AST/SGOT) 


 15 U/L (15-37) 


 


 





 


Alanine Aminotransferase


(ALT/SGPT) 


 22 U/L (14-59) 


 


 





 


Alkaline Phosphatase


 


 68 U/L


() 


 





 


Creatine Kinase


 


 128 U/L


() 


 





 


Creatine Kinase MB (Mass)


 


 1.0 ng/mL


(0.0-3.6) 


 





 


Creatine Kinase MB Relative


Index 


 0.8 % (0-4) 


 


 





 


Troponin I Quantitative


 


 < 0.017 ng/mL


(0.000-0.055) < 0.017 ng/mL


(0.000-0.055) 





 


NT-Pro-B-Type Natriuretic


Peptide 


 474 pg/mL


(0-124) 


 





 


Total Protein


 


 7.6 g/dL


(6.4-8.2) 


 





 


Albumin


 


 3.3 g/dL


(3.4-5.0) 


 





 


Albumin/Globulin Ratio  0.8 (1.0-1.7)   


 


Magnesium Level


 


 


 


 2.0 mg/dL


(1.8-2.4)


 


Thyroid Stimulating Hormone


(TSH) 


 


 


 2.400 uIU/mL


(0.358-3.74)


 


Test


 12/18/19


03:58 


 


 





 


Troponin I Quantitative


 < 0.017 ng/mL


(0.000-0.055) 


 


 











Laboratory Tests








Test


 12/17/19


14:52 12/17/19


15:30 12/18/19


00:40 12/18/19


03:45


 


Urine Collection Type Unknown    


 


Urine Color Yellow    


 


Urine Clarity Clear    


 


Urine pH 5.5    


 


Urine Specific Gravity >=1.030    


 


Urine Protein


 Negative mg/dL


(NEG-TRACE) 


 


 





 


Urine Glucose (UA)


 Negative mg/dL


(NEG) 


 


 





 


Urine Ketones (Stick)


 Negative mg/dL


(NEG) 


 


 





 


Urine Blood Moderate (NEG)    


 


Urine Nitrite Negative (NEG)    


 


Urine Bilirubin Negative (NEG)    


 


Urine Urobilinogen Dipstick


 1.0 mg/dL (0.2


mg/dL) 


 


 





 


Urine Leukocyte Esterase Negative (NEG)    


 


Urine RBC Occ /HPF (0-2)    


 


Urine WBC Occ /HPF (0-4)    


 


Urine Squamous Epithelial


Cells Mod /LPF 


 


 


 





 


Urine Bacteria


 Few /HPF


(0-FEW) 


 


 





 


Urine Mucus Marked /LPF    


 


White Blood Count


 


 7.9 x10^3/uL


(4.0-11.0) 


 





 


Red Blood Count


 


 4.24 x10^6/uL


(3.50-5.40) 


 





 


Hemoglobin


 


 12.1 g/dL


(12.0-15.5) 


 





 


Hematocrit


 


 37.0 %


(36.0-47.0) 


 





 


Mean Corpuscular Volume  87 fL ()   


 


Mean Corpuscular Hemoglobin  28 pg (25-35)   


 


Mean Corpuscular Hemoglobin


Concent 


 33 g/dL


(31-37) 


 





 


Red Cell Distribution Width


 


 13.7 %


(11.5-14.5) 


 





 


Platelet Count


 


 228 x10^3/uL


(140-400) 


 





 


Neutrophils (%) (Auto)  56 % (31-73)   


 


Lymphocytes (%) (Auto)  33 % (24-48)   


 


Monocytes (%) (Auto)  9 % (0-9)   


 


Eosinophils (%) (Auto)  2 % (0-3)   


 


Basophils (%) (Auto)  0 % (0-3)   


 


Neutrophils # (Auto)


 


 4.4 x10^3/uL


(1.8-7.7) 


 





 


Lymphocytes # (Auto)


 


 2.6 x10^3/uL


(1.0-4.8) 


 





 


Monocytes # (Auto)


 


 0.7 x10^3/uL


(0.0-1.1) 


 





 


Eosinophils # (Auto)


 


 0.2 x10^3/uL


(0.0-0.7) 


 





 


Basophils # (Auto)


 


 0.0 x10^3/uL


(0.0-0.2) 


 





 


Prothrombin Time


 


 12.8 SEC


(11.7-14.0) 


 





 


Prothromb Time International


Ratio 


 1.0 (0.8-1.1) 


 


 





 


Activated Partial


Thromboplast Time 


 35 SEC (24-38) 


 


 





 


Sodium Level


 


 142 mmol/L


(136-145) 


 





 


Potassium Level


 


 3.5 mmol/L


(3.5-5.1) 


 





 


Chloride Level


 


 105 mmol/L


() 


 





 


Carbon Dioxide Level


 


 29 mmol/L


(21-32) 


 





 


Anion Gap  8 (6-14)   


 


Blood Urea Nitrogen


 


 12 mg/dL


(7-20) 


 





 


Creatinine


 


 1.0 mg/dL


(0.6-1.0) 


 





 


Estimated GFR


(Cockcroft-Gault) 


 61.4 


 


 





 


BUN/Creatinine Ratio  12 (6-20)   


 


Glucose Level


 


 100 mg/dL


(70-99) 


 





 


Calcium Level


 


 8.8 mg/dL


(8.5-10.1) 


 





 


Total Bilirubin


 


 0.3 mg/dL


(0.2-1.0) 


 





 


Aspartate Amino Transf


(AST/SGOT) 


 15 U/L (15-37) 


 


 





 


Alanine Aminotransferase


(ALT/SGPT) 


 22 U/L (14-59) 


 


 





 


Alkaline Phosphatase


 


 68 U/L


() 


 





 


Creatine Kinase


 


 128 U/L


() 


 





 


Creatine Kinase MB (Mass)


 


 1.0 ng/mL


(0.0-3.6) 


 





 


Creatine Kinase MB Relative


Index 


 0.8 % (0-4) 


 


 





 


Troponin I Quantitative


 


 < 0.017 ng/mL


(0.000-0.055) < 0.017 ng/mL


(0.000-0.055) 





 


NT-Pro-B-Type Natriuretic


Peptide 


 474 pg/mL


(0-124) 


 





 


Total Protein


 


 7.6 g/dL


(6.4-8.2) 


 





 


Albumin


 


 3.3 g/dL


(3.4-5.0) 


 





 


Albumin/Globulin Ratio  0.8 (1.0-1.7)   


 


Magnesium Level


 


 


 


 2.0 mg/dL


(1.8-2.4)


 


Thyroid Stimulating Hormone


(TSH) 


 


 


 2.400 uIU/mL


(0.358-3.74)


 


Test


 12/18/19


03:58 


 


 





 


Troponin I Quantitative


 < 0.017 ng/mL


(0.000-0.055) 


 


 











Brief Hospital Course


Ms. Walsh  is a 39 old AA femal who has severe CM with EF 10-15% has 

indwelling ST hilario AICD, follows with [facility] cards, Admitted here for CP and now EF 

40% (latest echo) on entresto and other meds  ie lasix, KCl and maintains 

compliance, We had to start some kcl10 bec of low K, CARds thinking changing 

coreg to toprol sec to HR, will see how she trends HR 70s now asymptomatic, [facility]

ld be able to dc later - no rx needed unless cards changes the BB of choice





No PT needs


FF up cards on dc c/o [facility]





Discharge Information


Condition at Discharge:  Improved, Stable


Follow Up:  Weeks ([facility] cards as scheduled)


Disposition/Orders:  D/C to Home


Scheduled


Atorvastatin Calcium (Atorvastatin Calcium) 40 Mg Tablet, 40 MG PO QHS for 

lipids, #60


   Prescribed by: FREEDOM SEARS on 12/18/19 1005


Carvedilol (Carvedilol) 25 Mg Tablet, 25 MG PO BIDWMEALS for CARDIAC, (Reported)


   Entered as Reported by: RUBEN MALHOTRA on 12/17/19 2156


   Last Taken: Unknown Dose on 12/17/19 0900     Last Action: Converted on 12/1 7/19 2157 by RUBEN MALHOTRA


Folic Acid (Folic Acid) 0.8 Mg Capsule, 1 CAP PO DAILY for supplement for 30 

Days, #30 Ref 0 (Reported)


   Entered as Reported by: RUBEN MALHOTRA on 12/17/19 2156


   Last Taken: Unknown Dose on 12/17/19      Last Action: Converted on 12/17/19 2157 by RUBEN MALHOTRA


Furosemide (Furosemide) 40 Mg Tablet, 40 MG PO DAILY, #40


   Prescribed by: BHASKAR CONTEH MD on 5/13/17 1254


   Last Taken: Unknown Dose on 12/16/19      Last Action: Last Taken Edited on 

12/17/19 2156 by RUBEN MALHOTRA


Lisinopril (Lisinopril) 5 Mg Tablet, 2.5 MG PO DAILY for chf, htn, #60


   Prescribed by: FREEDOM SEARS on 12/18/19 1005


Metronidazole (Flagyl) 500 Mg Tablet, 500 MG PO Q12HR for BActerial vaginosis 

for 7 Days, #14


   Prescribed by: FREEDOM SEARS on 12/18/19 1005


Potassium Chloride (Klor-Con 10) 10 Meq Tablet.er, 10 MEQ PO DAILYWBKFT for low 

k for 7 Days, #7


   Prescribed by: FREEDOM SEARS on 12/18/19 1005


Sacubitril/Valsartan (Entresto 97 mg-103 mg Tablet) 1 Each Tablet, 1 EACH PO BID

for heart, (Reported)


   Entered as Reported by: RUBEN MALHOTRA on 12/17/19 2156


   Last Taken: Unknown Dose on 12/17/19 0900     Last Action: Converted on 

12/17/19 2157 by RUBEN MALHOTRA





Scheduled PRN


Albuterol Sulfate (Proair Hfa Inhaler) 8.5 Gm Hfa.aer.ad, 1 PUFF INH PRN Q6HRS 

PRN for SHORTNESS OF BREATH, Ref 0 (Reported)


   Entered as Reported by: DINESH DAVIS on 5/12/17 0424


   Last Action: Continued on 12/17/19 2002 by MD ROION HYDE CHERRIE Y MD           Dec 18, 2019 11:47

## 2019-12-18 NOTE — NUR
This nurse paged cardiology on call. David returned paged and after this nurse read him 
the reports he agreed patient ok to discharge.